# Patient Record
Sex: MALE | Race: WHITE | NOT HISPANIC OR LATINO | URBAN - METROPOLITAN AREA
[De-identification: names, ages, dates, MRNs, and addresses within clinical notes are randomized per-mention and may not be internally consistent; named-entity substitution may affect disease eponyms.]

---

## 2017-02-02 ENCOUNTER — ALLSCRIPTS OFFICE VISIT (OUTPATIENT)
Dept: OTHER | Facility: OTHER | Age: 42
End: 2017-02-02

## 2018-01-14 VITALS
DIASTOLIC BLOOD PRESSURE: 80 MMHG | WEIGHT: 175 LBS | RESPIRATION RATE: 14 BRPM | SYSTOLIC BLOOD PRESSURE: 130 MMHG | HEART RATE: 74 BPM | BODY MASS INDEX: 29.12 KG/M2

## 2022-01-13 ENCOUNTER — SEXUAL HEALTH (OUTPATIENT)
Dept: SURGERY | Facility: CLINIC | Age: 47
End: 2022-01-13

## 2022-01-13 DIAGNOSIS — Z11.3 SCREENING FOR STD (SEXUALLY TRANSMITTED DISEASE): Primary | ICD-10-CM

## 2022-01-13 NOTE — PROGRESS NOTES
Assessment/Plan:  Urine collected for GC/CT testing  Blood collected for HIV/syphilis testing  Recommend safer sex practices including 100% condom use  Recommend regular STD testing  Follow up 1 week for results  Practicing safe sex using a condom for each sexually encounter  Condoms offer the best protection against STD's by acting as a physical barrier to prevent the exchange of semen, vaginal fluids, and blood between partners  Condoms are not a 100% effective in protection  Reducing the number of sexual partners can also help to reduce the risk of the transmission of STD's along with regular STD screening  Patient verbalized understanding of all that was discusse today and agrees with plan  He was able to ask all of his questions and comprehensive answers were provided to the best of my ability  No problem-specific Assessment & Plan notes found for this encounter  Diagnoses and all orders for this visit:    Screening for STD (sexually transmitted disease)          Subjective:      Patient ID: Sumit Hare is a 56 y o  male  Patient presents to STD clinic for STD screening  Reports having multiple female partners and does not use condoms  Denies having any urethral discharge, burning or pain with urination, blood in urine, fever, chills, rash, sores, testicular pain  The following portions of the patient's history were reviewed and updated as appropriate: allergies, current medications, past family history, past medical history, past social history, past surgical history and problem list     Review of Systems   Constitutional: Negative for chills, diaphoresis, fatigue and fever  Gastrointestinal: Negative for abdominal pain  Genitourinary: Negative for decreased urine volume, difficulty urinating, dysuria, frequency, genital sores, hematuria, penile discharge, penile pain, penile swelling, scrotal swelling, testicular pain and urgency  Skin: Negative for rash and wound  Hematological: Negative for adenopathy  Objective: There were no vitals taken for this visit  Physical Exam  Nursing note reviewed  Constitutional:       General: He is not in acute distress  Appearance: Normal appearance  He is not ill-appearing, toxic-appearing or diaphoretic  HENT:      Head: Normocephalic and atraumatic  Eyes:      Conjunctiva/sclera: Conjunctivae normal    Neurological:      Mental Status: He is alert and oriented to person, place, and time  Gait: Gait normal    Psychiatric:         Mood and Affect: Mood normal          Behavior: Behavior normal          Thought Content: Thought content normal          Judgment: Judgment normal                 CHIEF CONCERN(S)        Condom Used: Occasionally    Sexual Preference :  Female    Date of Last Sexual Exposure: 1/11/22    # of Partners: Last 30 days 2, Last 90 days 4 and Last Year 6    Sexual Practices: Oral and Vaginal      Previously Sexually Transmitted Diseases  Type Date Source of Care Treatment Comment   Denies                         Test Date Results   [facility] 1/13/22 Pending   [facility] 1/13/22    GC 1/13/22    CT 1/13/22          1  CURRENT RISK BEHAVIOR(S)    Unprotected sex with multiple/anonymous partners     PREVIOUS SUCCESSES    Used condoms when having sex, Maintained a monogamous relationship with only one partner and Discussed condom use prior to having sex with partner(s)        3  SAFER GOAL BEHAVIOR(S)    Always use condoms to have sex, Practice abstinence, Pre-exposure prophylaxis (PrEP), Practice monogamy and Get tested if condom breaks/ leaks          4   PERSON ACTION PLAN:    > BARRIERS:    No condom use or discussions and Get involved in a monogamist relationship    > BENEFITS:    Obtain free condoms from [facility] to decrease STD exposure and Provides a healthier sexual relationship and can reduce STD's        > ACTION STEPS:      Use condoms at least 50% of the time and steadily increase over time until condom use is 100% of the time, Discuss condom use prior to having sex with partner(s) and Get tested is an exposure occurred such as a condom breaks/ leaked          4   REFERRALS:  Consent given for HIV testing

## 2022-01-20 ENCOUNTER — SEXUAL HEALTH (OUTPATIENT)
Dept: SURGERY | Facility: CLINIC | Age: 47
End: 2022-01-20

## 2022-01-20 DIAGNOSIS — Z71.2 ENCOUNTER TO DISCUSS TEST RESULTS: Primary | ICD-10-CM

## 2022-09-20 ENCOUNTER — OFFICE VISIT (OUTPATIENT)
Dept: FAMILY MEDICINE CLINIC | Facility: CLINIC | Age: 47
End: 2022-09-20
Payer: COMMERCIAL

## 2022-09-20 VITALS
HEIGHT: 65 IN | DIASTOLIC BLOOD PRESSURE: 82 MMHG | HEART RATE: 71 BPM | OXYGEN SATURATION: 98 % | WEIGHT: 194 LBS | BODY MASS INDEX: 32.32 KG/M2 | SYSTOLIC BLOOD PRESSURE: 135 MMHG

## 2022-09-20 DIAGNOSIS — Z13.1 SCREENING FOR DIABETES MELLITUS: ICD-10-CM

## 2022-09-20 DIAGNOSIS — L50.8 URTICARIA, ACUTE: ICD-10-CM

## 2022-09-20 DIAGNOSIS — Z00.00 PREVENTATIVE HEALTH CARE: Primary | ICD-10-CM

## 2022-09-20 DIAGNOSIS — Z13.220 SCREENING, LIPID: ICD-10-CM

## 2022-09-20 DIAGNOSIS — Z12.5 SCREENING FOR PROSTATE CANCER: ICD-10-CM

## 2022-09-20 DIAGNOSIS — Z00.00 ANNUAL PHYSICAL EXAM: ICD-10-CM

## 2022-09-20 DIAGNOSIS — Z23 NEED FOR VACCINATION: ICD-10-CM

## 2022-09-20 DIAGNOSIS — D17.20 LIPOMA OF UPPER EXTREMITY, UNSPECIFIED LATERALITY: ICD-10-CM

## 2022-09-20 DIAGNOSIS — Z12.11 SCREEN FOR COLON CANCER: ICD-10-CM

## 2022-09-20 PROCEDURE — 90471 IMMUNIZATION ADMIN: CPT | Performed by: FAMILY MEDICINE

## 2022-09-20 PROCEDURE — 3725F SCREEN DEPRESSION PERFORMED: CPT | Performed by: FAMILY MEDICINE

## 2022-09-20 PROCEDURE — 99386 PREV VISIT NEW AGE 40-64: CPT | Performed by: FAMILY MEDICINE

## 2022-09-20 PROCEDURE — 90715 TDAP VACCINE 7 YRS/> IM: CPT | Performed by: FAMILY MEDICINE

## 2022-09-20 NOTE — PROGRESS NOTES
ADULT ANNUAL [address]  [facility]    NAME: Joon Huitron  AGE: 56 y o  SEX: male  : 1975     DATE: 2022     Assessment and Plan:     Problem List Items Addressed This Visit        Musculoskeletal and Integument    Urticaria, acute       Other    Preventative health care - Primary     Advised metabolic labs, including PSA for prostate cancer screening,  patient prefers to get Cologuard for colorectal cancer screening  Patient received Adacel today  Declines flu vaccine  Lipoma of upper extremity     Painful lipoma of left UE, refer to gen surgery  Relevant Orders    Ambulatory Referral to General Surgery      Other Visit Diagnoses     Screen for colon cancer        Relevant Orders    Cologuard    Screening for prostate cancer        Relevant Orders    PSA, Total Screen    Need for vaccination        Relevant Orders    TDAP VACCINE GREATER THAN OR EQUAL TO 8YO IM (Completed)    Screening for diabetes mellitus        Relevant Orders    Comprehensive metabolic panel    Screening, lipid        Relevant Orders    Lipid panel          Immunizations and preventive care screenings were discussed with patient today  Appropriate education was printed on patient's after visit summary  Discussed risks and benefits of prostate cancer screening  We discussed the controversial history of PSA screening for prostate cancer in the [place] as well as the risk of over detection and over treatment of prostate cancer by way of PSA screening  The patient understands that PSA blood testing is an imperfect way to screen for prostate cancer and that elevated PSA levels in the blood may also be caused by infection, inflammation, prostatic trauma or manipulation, urological procedures, or by benign prostatic enlargement      The role of the digital rectal examination in prostate cancer screening was also discussed and I discussed with him that there is large interobserver variability in the findings of digital rectal examination  Counseling:  Dental Health: discussed importance of regular tooth brushing, flossing, and dental visits  · Exercise: the importance of regular exercise/physical activity was discussed  Recommend exercise 3-5 times per week for at least 30 minutes  BMI Counseling: Body mass index is 32 28 kg/m²  The BMI is above normal  Nutrition recommendations include decreasing portion sizes, encouraging healthy choices of fruits and vegetables and decreasing fast food intake  Exercise recommendations include moderate physical activity 150 minutes/week  No pharmacotherapy was ordered  Rationale for BMI follow-up plan is due to patient being overweight or obese  Depression Screening and Follow-up Plan: Patient was screened for depression during today's encounter  They screened negative with a PHQ-2 score of 0  No follow-ups on file  Chief Complaint:     Chief Complaint   Patient presents with    Physical Exam     New patient      History of Present Illness:     Adult Annual Physical   Patient here for a comprehensive physical exam  The patient reports problems - Lump on left forearm, chronic  Hurting now       Diet and Physical Activity  · Diet/Nutrition: well balanced diet  · Exercise: moderate cardiovascular exercise  Depression Screening  PHQ-2/9 Depression Screening    Little interest or pleasure in doing things: 0 - not at all  Feeling down, depressed, or hopeless: 0 - not at all  PHQ-2 Score: 0  PHQ-2 Interpretation: Negative depression screen       General Health  · Sleep: sleeps well  · Hearing: normal - bilateral   · Vision: no vision problems  · Dental: regular dental visits   Health  · Symptoms include: none     Review of Systems:     Review of Systems   Constitutional: Negative  Respiratory: Negative  Cardiovascular: Negative  Gastrointestinal: Negative      Musculoskeletal: Negative  Negative for myalgias  Skin:        Lump b/l forearms, abdomen wall  Neurological: Negative  Psychiatric/Behavioral: Negative  Past Medical History:     History reviewed  No pertinent past medical history  Past Surgical History:     History reviewed  No pertinent surgical history  Family History:     Family History   Problem Relation Age of Onset    Hyperlipidemia Father     Coronary artery disease Father       Social History:     Social History     Socioeconomic History    Marital status: /Civil Union     Spouse name: None    Number of children: None    Years of education: None    Highest education level: None   Occupational History    None   Tobacco Use    Smoking status: Never Smoker    Smokeless tobacco: Never Used   Vaping Use    Vaping Use: Never used   Substance and Sexual Activity    Alcohol use: Never    Drug use: Never    Sexual activity: None   Other Topics Concern    None   Social History Narrative    None     Social Determinants of Health     Financial Resource Strain: Not on file   Food Insecurity: Not on file   Transportation Needs: Not on file   Physical Activity: Not on file   Stress: Not on file   Social Connections: Not on file   Intimate Partner Violence: Not on file   Housing Stability: Not on file      Current Medications:     No current outpatient medications on file  No current facility-administered medications for this visit  Allergies:     No Known Allergies   Physical Exam:     /82   Pulse 71   Ht 5' 5" (1 651 m)   Wt 88 kg (194 lb)   SpO2 98%   BMI 32 28 kg/m²     Physical Exam  Vitals and nursing note reviewed  Constitutional:       Appearance: He is well-developed  HENT:      Right Ear: External ear normal       Left Ear: External ear normal    Eyes:      Pupils: Pupils are equal, round, and reactive to light  Cardiovascular:      Rate and Rhythm: Normal rate and regular rhythm        Heart sounds: Normal heart sounds  Pulmonary:      Effort: Pulmonary effort is normal       Breath sounds: Normal breath sounds  Abdominal:      Palpations: Abdomen is soft  There is no mass  Tenderness: There is no abdominal tenderness  There is no guarding  Musculoskeletal:         General: Normal range of motion  Cervical back: Normal range of motion and neck supple  Skin:     Comments: Lipomas on both forearms, trunk  Neurological:      Mental Status: He is alert and oriented to person, place, and time  Psychiatric:         Behavior: Behavior normal          Thought Content:  Thought content normal          Judgment: Judgment normal           Bryant Vee MD  [place] [id number]

## 2022-09-20 NOTE — ASSESSMENT & PLAN NOTE
Advised metabolic labs, including PSA for prostate cancer screening,  patient prefers to get Cologuard for colorectal cancer screening  Patient received Adacel today  Declines flu vaccine

## 2022-09-20 NOTE — PATIENT INSTRUCTIONS

## 2022-09-23 DIAGNOSIS — E78.2 MIXED HYPERLIPIDEMIA: Primary | ICD-10-CM

## 2022-09-23 LAB
ALBUMIN SERPL-MCNC: 4.5 G/DL (ref 4–5)
ALBUMIN/GLOB SERPL: 2 {RATIO} (ref 1.2–2.2)
ALP SERPL-CCNC: 71 IU/L (ref 44–121)
ALT SERPL-CCNC: 7 IU/L (ref 0–44)
AST SERPL-CCNC: 16 IU/L (ref 0–40)
BILIRUB SERPL-MCNC: 0.3 MG/DL (ref 0–1.2)
BUN SERPL-MCNC: 15 MG/DL (ref 6–24)
BUN/CREAT SERPL: 13 (ref 9–20)
CALCIUM SERPL-MCNC: 9.6 MG/DL (ref 8.7–10.2)
CHLORIDE SERPL-SCNC: 104 MMOL/L (ref 96–106)
CHOLEST SERPL-MCNC: 196 MG/DL (ref 100–199)
CO2 SERPL-SCNC: 22 MMOL/L (ref 20–29)
CREAT SERPL-MCNC: 1.2 MG/DL (ref 0.76–1.27)
EGFR: 76 ML/MIN/1.73
GLOBULIN SER-MCNC: 2.2 G/DL (ref 1.5–4.5)
GLUCOSE SERPL-MCNC: 84 MG/DL (ref 65–99)
HDLC SERPL-MCNC: 43 MG/DL
LDLC SERPL CALC-MCNC: 125 MG/DL (ref 0–99)
POTASSIUM SERPL-SCNC: 4.5 MMOL/L (ref 3.5–5.2)
PROT SERPL-MCNC: 6.7 G/DL (ref 6–8.5)
PSA SERPL-MCNC: 2.2 NG/ML (ref 0–4)
SL AMB VLDL CHOLESTEROL CALC: 28 MG/DL (ref 5–40)
SODIUM SERPL-SCNC: 141 MMOL/L (ref 134–144)
TRIGL SERPL-MCNC: 155 MG/DL (ref 0–149)

## 2022-09-27 LAB — COLOGUARD RESULT REPORTABLE: NEGATIVE

## 2022-09-28 ENCOUNTER — TELEPHONE (OUTPATIENT)
Dept: FAMILY MEDICINE CLINIC | Facility: CLINIC | Age: 47
End: 2022-09-28

## 2022-09-28 NOTE — TELEPHONE ENCOUNTER
----- Message from Abdi Kang MD sent at 9/28/2022  1:47 PM EDT -----  Please call patient with normal results

## 2022-12-01 ENCOUNTER — OFFICE VISIT (OUTPATIENT)
Dept: FAMILY MEDICINE CLINIC | Facility: CLINIC | Age: 47
End: 2022-12-01

## 2022-12-01 VITALS
OXYGEN SATURATION: 95 % | SYSTOLIC BLOOD PRESSURE: 128 MMHG | BODY MASS INDEX: 31.99 KG/M2 | WEIGHT: 192 LBS | HEIGHT: 65 IN | HEART RATE: 85 BPM | DIASTOLIC BLOOD PRESSURE: 82 MMHG

## 2022-12-01 DIAGNOSIS — N48.21 PENILE ABSCESS: Primary | ICD-10-CM

## 2022-12-01 DIAGNOSIS — L30.9 ECZEMA, UNSPECIFIED TYPE: ICD-10-CM

## 2022-12-01 RX ORDER — CEPHALEXIN 500 MG/1
500 CAPSULE ORAL EVERY 12 HOURS SCHEDULED
Qty: 14 CAPSULE | Refills: 0 | Status: SHIPPED | OUTPATIENT
Start: 2022-12-01 | End: 2022-12-08

## 2022-12-01 NOTE — PROGRESS NOTES
A Subjective:      Patient ID: Jerman Gutierrez is a 56 y o  male  HPI    Patient is here reporting a small pimple on the penile shaft, notice few days ago  No pain / redness in area  Also reports rash on both temples  He has a been using OTC hydrocortisone for 3 weeks now, and has noticed a mild improvement  Skin texture however remains slightly  rough  History reviewed  No pertinent past medical history  Family History   Problem Relation Age of Onset   • Hyperlipidemia Father    • Coronary artery disease Father        History reviewed  No pertinent surgical history  reports that he has never smoked  He has never used smokeless tobacco  He reports that he does not drink alcohol and does not use drugs  Current Outpatient Medications:   •  cephalexin (KEFLEX) 500 mg capsule, Take 1 capsule (500 mg total) by mouth every 12 (twelve) hours for 7 days, Disp: 14 capsule, Rfl: 0    The following portions of the patient's history were reviewed and updated as appropriate: allergies, current medications, past family history, past medical history, past social history, past surgical history and problem list     Review of Systems   Constitutional: Negative  Respiratory: Negative  Cardiovascular: Negative  Gastrointestinal: Negative  Genitourinary:        In growing hair on penile shaft   Musculoskeletal: Negative  Negative for myalgias  Neurological: Negative  Negative for focal weakness  Psychiatric/Behavioral: Negative  Objective:    /82   Pulse 85   Ht 5' 5" (1 651 m)   Wt 87 1 kg (192 lb)   SpO2 95%   BMI 31 95 kg/m²      Physical Exam  Constitutional:       Appearance: He is well-developed and well-nourished  HENT:      Mouth/Throat:      Pharynx: No oropharyngeal exudate  Cardiovascular:      Rate and Rhythm: Normal rate and regular rhythm  Pulmonary:      Effort: Pulmonary effort is normal       Breath sounds: Normal breath sounds     Abdominal: General: Bowel sounds are normal       Palpations: Abdomen is soft  Genitourinary:     Comments: Folliculitis at base of the penile shaft  Overlying skin appears reviewed the normal    Skin:     Comments: Slightly rough texture on both temples  Neurological:      Mental Status: He is alert and oriented to person, place, and time  Psychiatric:         Behavior: Behavior normal          Judgment: Judgment normal            No results found for this or any previous visit (from the past 1008 hour(s))  Assessment/Plan:    No problem-specific Assessment & Plan notes found for this encounter  Problem List Items Addressed This Visit        Musculoskeletal and Integument    Eczema     Dry skin on both temples  Improving with topical otc hydrocortisone  Continue with caution  Refer to Dermatology if symptoms flare up         Relevant Orders    Ambulatory Referral to Dermatology       Genitourinary    Penile abscess - Primary     Small folliculitis on base of penile shaft  No overlying inflammation noted  A patient started on antibiotic  Advised warm compresses of the area  Recommended urology evaluation if symptoms worsen            Relevant Medications    cephalexin (KEFLEX) 500 mg capsule    Other Relevant Orders    Ambulatory Referral to Urology

## 2022-12-01 NOTE — ASSESSMENT & PLAN NOTE
Small folliculitis on base of penile shaft  No overlying inflammation noted  A patient started on antibiotic  Advised warm compresses of the area  Recommended urology evaluation if symptoms worsen

## 2022-12-01 NOTE — ASSESSMENT & PLAN NOTE
Dry skin on both temples  Improving with topical otc hydrocortisone  Continue with caution    Refer to Dermatology if symptoms flare up

## 2024-04-17 ENCOUNTER — OFFICE VISIT (OUTPATIENT)
Dept: FAMILY MEDICINE CLINIC | Facility: CLINIC | Age: 49
End: 2024-04-17
Payer: COMMERCIAL

## 2024-04-17 VITALS — HEIGHT: 65 IN | BODY MASS INDEX: 31.65 KG/M2 | WEIGHT: 190 LBS

## 2024-04-17 DIAGNOSIS — T30.0 BURN: Primary | ICD-10-CM

## 2024-04-17 DIAGNOSIS — L03.90 CELLULITIS, UNSPECIFIED CELLULITIS SITE: ICD-10-CM

## 2024-04-17 PROCEDURE — 99213 OFFICE O/P EST LOW 20 MIN: CPT | Performed by: NURSE PRACTITIONER

## 2024-04-17 RX ORDER — CEPHALEXIN 500 MG/1
500 CAPSULE ORAL EVERY 6 HOURS SCHEDULED
Qty: 28 CAPSULE | Refills: 0 | Status: SHIPPED | OUTPATIENT
Start: 2024-04-17 | End: 2024-04-24

## 2024-04-17 NOTE — PROGRESS NOTES
Virtual Regular Visit    Verification of patient location:    Patient is located at Home in the following state in which I hold an active license [place]      Assessment/Plan:    Problem List Items Addressed This Visit          Surgery/Wound/Pain    Burn - Primary    Relevant Medications    cephalexin (KEFLEX) 500 mg capsule    Other Relevant Orders    Ambulatory Referral to Wound Care       Other    Cellulitis    Relevant Medications    cephalexin (KEFLEX) 500 mg capsule    Other Relevant Orders    Ambulatory Referral to Wound Care         Patient reports that he accidentally spilled hot water on his foot while cooking pasta a week ago.   Patient reports that some of the skin ripped off and he has a hole in his foot.   Patient reports that the area is red around it.   Denies any fever or drainage.   Patient has been putting neosporin on it.   Wound noted on top of left foot on video visit.   Erythema noted around it.   Keflex prescribed for infection. Medication information and side effects reviewed.   Patient referred to [facility] for further evaluation and treatment.   Patient instructed to follow-up prn.     Reason for visit is   Chief Complaint   Patient presents with    Wound    Virtual Regular Visit        Encounter provider [facility]    Provider located at [address] [place] [id number]      Recent Visits  No visits were found meeting these conditions.  Showing recent visits within past 7 days and meeting all other requirements  Today's Visits  Date Type Provider Dept   04/17/24 Office Visit [facility]   Showing today's visits and meeting all other requirements  Future Appointments  No visits were found meeting these conditions.  Showing future appointments within next 150 days and meeting all other requirements       The patient was identified by name and date of birth. Jason Hughes was  "informed that this is a telemedicine visit and that the visit is being conducted through the [employer] platform. He agrees to proceed..  My office door was closed. No one else was in the room.  He acknowledged consent and understanding of privacy and security of the video platform. The patient has agreed to participate and understands they can discontinue the visit at any time.    Patient is aware this is a billable service.     Subjective  Jason Hughes is a 49 y.o. male  .      Patient reports that he burned the top of his foot while cooking pasta a week ago.   Patient reports that he accidentally spilled hot water on it.   Patient reports that some of the skin ripped off 2 days later.   Patient reports that he has a hole in his foot.   Patient reports that the area is red around it.   Denies any drainage.   Denies any fever.   Patient reports that he has been putting neosporin on it.   Patient reports that his foot does not look like it is healing.          History reviewed. No pertinent past medical history.    History reviewed. No pertinent surgical history.    Current Outpatient Medications   Medication Sig Dispense Refill    cephalexin (KEFLEX) 500 mg capsule Take 1 capsule (500 mg total) by mouth every 6 (six) hours for 7 days 28 capsule 0     No current facility-administered medications for this visit.        No Known Allergies    Review of Systems   Constitutional:  Negative for chills and fever.   HENT:  Negative for congestion, ear pain and sore throat.    Respiratory:  Negative for cough, shortness of breath and wheezing.    Cardiovascular:  Negative for chest pain.   Gastrointestinal:  Negative for abdominal pain, diarrhea, nausea and vomiting.   Skin:         As noted in HPI.    Neurological:  Negative for dizziness, light-headedness and headaches.       Video Exam    Vitals:    04/17/24 0855   Weight: 86.2 kg (190 lb)   Height: 5' 5\" (1.651 m)       Physical Exam  Vitals reviewed.   Constitutional:  "      General: He is not in acute distress.     Appearance: He is not ill-appearing or diaphoretic.   HENT:      Right Ear: External ear normal.      Left Ear: External ear normal.   Pulmonary:      Effort: Pulmonary effort is normal. No respiratory distress.   Skin:     Comments: Wound noted on left foot on video visit.   Erythema noted around it.    Neurological:      Mental Status: He is alert and oriented to person, place, and time.   Psychiatric:         Mood and Affect: Mood normal.          Visit Time  Total Visit Duration: 10 minutes

## 2024-04-26 ENCOUNTER — OFFICE VISIT (OUTPATIENT)
Dept: WOUND CARE | Facility: HOSPITAL | Age: 49
End: 2024-04-26
Payer: COMMERCIAL

## 2024-04-26 VITALS
HEART RATE: 72 BPM | BODY MASS INDEX: 31.65 KG/M2 | DIASTOLIC BLOOD PRESSURE: 97 MMHG | SYSTOLIC BLOOD PRESSURE: 145 MMHG | HEIGHT: 65 IN | TEMPERATURE: 97.6 F | RESPIRATION RATE: 15 BRPM | WEIGHT: 190 LBS

## 2024-04-26 DIAGNOSIS — S91.302A OPEN WOUND OF LEFT FOOT, INITIAL ENCOUNTER: Primary | ICD-10-CM

## 2024-04-26 PROCEDURE — 97597 DBRDMT OPN WND 1ST 20 CM/<: CPT | Performed by: FAMILY MEDICINE

## 2024-04-26 PROCEDURE — 99213 OFFICE O/P EST LOW 20 MIN: CPT | Performed by: FAMILY MEDICINE

## 2024-04-26 PROCEDURE — 99203 OFFICE O/P NEW LOW 30 MIN: CPT | Performed by: FAMILY MEDICINE

## 2024-04-26 RX ORDER — LIDOCAINE 40 MG/G
CREAM TOPICAL ONCE
Status: COMPLETED | OUTPATIENT
Start: 2024-04-26 | End: 2024-04-26

## 2024-04-26 RX ORDER — IBUPROFEN 200 MG
400 TABLET ORAL EVERY 6 HOURS PRN
COMMUNITY

## 2024-04-26 RX ADMIN — LIDOCAINE: 40 CREAM TOPICAL at 08:39

## 2024-04-26 NOTE — PROGRESS NOTES
Patient ID: Jason Hughes is a 49 y.o. male Date of Birth 1975       Chief Complaint   Patient presents with   • New Patient Visit     Left foot       Allergies:  Patient has no known allergies.    Diagnosis:      Diagnosis ICD-10-CM Associated Orders   1. Open wound of left foot, initial encounter  S91.302A lidocaine (LMX) 4 % cream     Wound cleansing and dressings Traumatic Anterior;Left Foot     Wound Procedure Treatment Traumatic Anterior;Left Foot     Debridement Traumatic Anterior;Left Foot              Assessment & Plan:  Traumatic wound of the dorsum of the left foot.  No clinical signs of infection at this time.  Status posttreatment with cephalexin via primary care.  Selective debridement.  Puracol Plus Ag and small bordered foam to be changed 3 times a week.  Follow-up in 1 week.         Subjective:   4/26/2024: First visit for this 49-year-old male referred to the wound center because of a wound on the dorsum of his left foot.  Approximately 2 weeks ago he was boiling [place] and the water spilled onto his left foot.  He was wearing crocs and therefore sustained a small area of burn.  He states that although there was erythema it did not blister or open.  About a week ago he was working out with [employer] and hit the top of the foot where the burn was.  This caused an open wound which led to the referral to the wound center.  He was seen by PCP who prescribed cephalexin.  Past medical history is unremarkable.        The following portions of the patient's history were reviewed and updated as appropriate:   Patient Active Problem List   Diagnosis   • Preventative health care   • Urticaria, acute   • Lipoma of upper extremity   • Penile abscess   • Eczema   • Burn   • Cellulitis     History reviewed. No pertinent past medical history.  History reviewed. No pertinent surgical history.  Family History   Problem Relation Age of Onset   • Hyperlipidemia Father    • Coronary artery disease Father       Social  "History     Socioeconomic History   • Marital status: /Civil Union     Spouse name: Billie   • Number of children: 3   • Years of education: 12   • Highest education level: High school graduate   Occupational History   • None   Tobacco Use   • Smoking status: Never   • Smokeless tobacco: Never   Vaping Use   • Vaping status: Never Used   Substance and Sexual Activity   • Alcohol use: Never   • Drug use: Never   • Sexual activity: None   Other Topics Concern   • None   Social History Narrative   • None     Social Determinants of Health     Financial Resource Strain: Not on file   Food Insecurity: Not on file   Transportation Needs: Not on file   Physical Activity: Not on file   Stress: Not on file   Social Connections: Not on file   Intimate Partner Violence: Not on file   Housing Stability: Not on file        Current Outpatient Medications:   •  ibuprofen (MOTRIN) 200 mg tablet, Take 400 mg by mouth every 6 (six) hours as needed for mild pain, Disp: , Rfl:   No current facility-administered medications for this visit.    Review of Systems   Constitutional:  Negative for appetite change, chills, fatigue, fever and unexpected weight change.   HENT:  Negative for congestion, hearing loss and postnasal drip.    Respiratory:  Negative for cough and shortness of breath.    Cardiovascular:  Negative for leg swelling.   Musculoskeletal:  Negative for gait problem.   Skin:  Positive for wound (Left foot). Negative for rash.   Neurological:  Negative for numbness.   Hematological:  Does not bruise/bleed easily.   Psychiatric/Behavioral: Negative.         Objective:  /97   Pulse 72   Temp 97.6 °F (36.4 °C)   Resp 15   Ht 5' 5\" (1.651 m)   Wt 86.2 kg (190 lb)   BMI 31.62 kg/m²   Pain Score: 0-No pain (has pain if walking)     Physical Exam  Vitals and nursing note reviewed.   Constitutional:       Appearance: Normal appearance. He is well-developed and normal weight.   HENT:      Head: Normocephalic and " "atraumatic.   Cardiovascular:      Rate and Rhythm: Normal rate.      Pulses:           Dorsalis pedis pulses are 2+ on the left side.        Posterior tibial pulses are 2+ on the left side.   Pulmonary:      Effort: Pulmonary effort is normal.   Musculoskeletal:      Right lower leg: No edema.      Left lower leg: No edema.        Feet:    Feet:      Left foot:      Skin integrity: Skin breakdown and erythema present.      Comments: Circular wound, full-thickness with slough and fibrin.  Localized surrounding erythema without lymphangitic streaking.  Skin:     General: Skin is warm and dry.      Findings: Wound present.   Neurological:      Mental Status: He is alert and oriented to person, place, and time.   Psychiatric:         Attention and Perception: Attention normal.         Mood and Affect: Mood and affect normal.         Behavior: Behavior is cooperative.         Cognition and Memory: Cognition normal.         Wound 04/26/24 Traumatic Foot Anterior;Left (Active)   Wound Image Images linked 04/26/24 0813   Wound Description Yellow;Slough;Pink;Granulation tissue 04/26/24 0813   Justine-wound Assessment Pink 04/26/24 0813   Wound Length (cm) 0.7 cm 04/26/24 0813   Wound Width (cm) 0.7 cm 04/26/24 0813   Wound Depth (cm) 0.1 cm 04/26/24 0813   Wound Surface Area (cm^2) 0.49 cm^2 04/26/24 0813   Wound Volume (cm^3) 0.049 cm^3 04/26/24 0813   Calculated Wound Volume (cm^3) 0.05 cm^3 04/26/24 0813   Drainage Amount Small 04/26/24 0813   Drainage Description Serosanguineous 04/26/24 0813   Non-staged Wound Description Full thickness 04/26/24 0813   Dressing Status Intact (upon arrival) 04/26/24 0813        Debridement   Wound 04/26/24 Traumatic Foot Anterior;Left    Universal Protocol:  Consent: Verbal consent obtained. Written consent obtained.  Consent given by: patient  Time out: Immediately prior to procedure a \"time out\" was called to verify the correct patient, procedure, equipment, support staff and site/side " "marked as required.  Patient understanding: patient states understanding of the procedure being performed  Patient identity confirmed: verbally with patient    Debridement Details  Performed by: physician  Debridement type: selective  Pain control: lidocaine 4%      Post-debridement measurements  Length (cm): 0.7  Width (cm): 0.7  Depth (cm): 0.1  Percent debrided: 100%  Surface Area (cm^2): 0.49  Area Debrided (cm^2): 0.49  Volume (cm^3): 0.05    Devitalized tissue debrided: fibrin and slough  Instrument(s) utilized: curette  Bleeding: small  Hemostasis obtained with: pressure  Procedural pain (0-10): 0  Post-procedural pain: 0   Response to treatment: procedure was tolerated well               No results found for: \"HGBA1C\"    Wound Instructions:  Orders Placed This Encounter   Procedures   • Wound cleansing and dressings Traumatic Anterior;Left Foot     LEFT FOOT WOUND:    Wash your hands with soap and water.  Remove old dressing, discard into plastic bag and place in trash.  Cleanse the wound with Dove soap and water prior to applying a clean dressing. Do not use tissue or cotton balls. Do not scrub the wound. Pat dry using gauze.  Shower yes IF you cover wound in shower on the days you are not changing dressing.  On the days you are changing the dressing, wash the wound last in the shower with Dove soap and then rinse with water and get out of shower, pat dry and then apply wound product.  Do not soak wound. Do not leave wound open to air.    Apply Puracol AG to the foot wound.  Cover with bordered foam lite    Change dressing three times per week.      Protein: Eat protein with each meal to promote healing.  Examples of protein are fish, meat, chicken, nuts, peanut butter, eggs, lentils, edamame or a protein shake.    Wound infection:  If you have signs of infection please call the wound center.  If the wound center is closed- please go to the Emergency department.  Some signs of infection:  fever, chills, " "increased redness, red streaks, increase in pain, increased drainage.  Drainage with an odor, Change in drainage color: white/milky/green/tan/yellow,  an increase in swelling, chest pain and/or shortness of breath.     Standing Status:   Future     Standing Expiration Date:   5/3/2024   • Wound Procedure Treatment Traumatic Anterior;Left Foot     This order was created via procedure documentation   • Debridement Traumatic Anterior;Left Foot     This order was created via procedure documentation             Jonathan Soria MD, CHT, CWS    Portions of the record may have been created with voice recognition software. Occasional wrong word or \"sound alike\" substitutions may have occurred due to the inherent limitations of voice recognition software. Read the chart carefully and recognize, using context, where substitutions have occurred.    "

## 2024-04-26 NOTE — PATIENT INSTRUCTIONS
Orders Placed This Encounter   Procedures    Wound cleansing and dressings Traumatic Anterior;Left Foot     LEFT FOOT WOUND:    Wash your hands with soap and water.  Remove old dressing, discard into plastic bag and place in trash.  Cleanse the wound with Dove soap and water prior to applying a clean dressing. Do not use tissue or cotton balls. Do not scrub the wound. Pat dry using gauze.  Shower yes IF you cover wound in shower on the days you are not changing dressing.  On the days you are changing the dressing, wash the wound last in the shower with Dove soap and then rinse with water and get out of shower, pat dry and then apply wound product.  Do not soak wound. Do not leave wound open to air.    Apply Puracol AG to the foot wound.  Cover with bordered foam lite    Change dressing three times per week.      Protein: Eat protein with each meal to promote healing.  Examples of protein are fish, meat, chicken, nuts, peanut butter, eggs, lentils, edamame or a protein shake.    Wound infection:  If you have signs of infection please call the wound center.  If the wound center is closed- please go to the Emergency department.  Some signs of infection:  fever, chills, increased redness, red streaks, increase in pain, increased drainage.  Drainage with an odor, Change in drainage color: white/milky/green/tan/yellow,  an increase in swelling, chest pain and/or shortness of breath.     Standing Status:   Future     Standing Expiration Date:   5/3/2024

## 2024-04-26 NOTE — PROGRESS NOTES
Wound Procedure Treatment Traumatic Anterior;Left Foot    Performed by: Lily Allen RN  Authorized by: Jonathan Soria MD  Associated wounds:   Wound 04/26/24 Traumatic Foot Anterior;Left  Wound cleansed with:  NSS  Applied primary dressing:  Collagen dressing, Silver and Silicone bordered foam

## 2024-05-03 ENCOUNTER — OFFICE VISIT (OUTPATIENT)
Dept: WOUND CARE | Facility: HOSPITAL | Age: 49
End: 2024-05-03
Payer: COMMERCIAL

## 2024-05-03 VITALS
RESPIRATION RATE: 18 BRPM | SYSTOLIC BLOOD PRESSURE: 138 MMHG | TEMPERATURE: 96.7 F | DIASTOLIC BLOOD PRESSURE: 85 MMHG | HEART RATE: 63 BPM

## 2024-05-03 DIAGNOSIS — S91.302A OPEN WOUND OF LEFT FOOT, INITIAL ENCOUNTER: Primary | ICD-10-CM

## 2024-05-03 PROCEDURE — 97597 DBRDMT OPN WND 1ST 20 CM/<: CPT | Performed by: STUDENT IN AN ORGANIZED HEALTH CARE EDUCATION/TRAINING PROGRAM

## 2024-05-03 RX ORDER — LIDOCAINE 40 MG/G
CREAM TOPICAL ONCE
Status: COMPLETED | OUTPATIENT
Start: 2024-05-03 | End: 2024-05-03

## 2024-05-03 RX ADMIN — LIDOCAINE 1 APPLICATION: 40 CREAM TOPICAL at 08:32

## 2024-05-03 NOTE — PATIENT INSTRUCTIONS
Orders Placed This Encounter   Procedures    Wound cleansing and dressings Traumatic Anterior;Left Foot     LEFT FOOT WOUND:     Wash your hands with soap and water. Remove old dressing, discard into plastic bag and place in trash. Cleanse the wound with Dove soap and water prior to applying a clean dressing. Do not use tissue or cotton balls. Do not scrub the wound. Pat dry using gauze.     Shower yes IF you cover wound in shower on the days you are not changing dressing.     On the days you are changing the dressing, wash the wound last in the shower with Dove soap and then rinse with water and get out of shower, pat dry and then apply wound product. Do not soak wound. Do not leave wound open to air.     Apply Puracol AG to the foot wound.   Cover with bordered foam lite   Change dressing three times per week.       Protein: Eat protein with each meal to promote healing. Examples of protein are fish, meat, chicken, nuts, peanut butter, eggs, lentils, edamame or a protein shake. Wound infection: If you have signs of infection please call the wound center. If the wound center is closedplease go to the Emergency department. Some signs of infection: fever, chills, increased redness, red streaks, increase in pain, increased drainage. Drainage with an odor, Change in drainage color: white/milky/green/tan/yellow, an increase in swelling, chest pain and/or shortness of breath.     Standing Status:   Future     Standing Expiration Date:   5/10/2024    Wound Procedure Treatment Traumatic Anterior;Left Foot     This order was created via procedure documentation

## 2024-05-03 NOTE — PROGRESS NOTES
Wound Procedure Treatment Traumatic Anterior;Left Foot    Performed by: Janelle Kimball RN  Authorized by: Sarah Sandy MD  Associated wounds:   Wound 04/26/24 Traumatic Foot Anterior;Left  Wound cleansed with:  NSS  Applied primary dressing:  Collagen dressing, Silver and Silicone bordered foam

## 2024-05-03 NOTE — PROGRESS NOTES
"Patient ID: Jason Hughes is a 49 y.o. male Date of Birth 1975     Chief Complaint  Chief Complaint   Patient presents with    Follow Up Wound Care Visit     Left foot wound       Allergies  Patient has no known allergies.    Assessment:     Diagnoses and all orders for this visit:    Open wound of left foot, initial encounter  -     lidocaine (LMX) 4 % cream  -     Wound cleansing and dressings Traumatic Anterior;Left Foot; Future  -     Wound Procedure Treatment Traumatic Anterior;Left Foot  -     Debridement                Debridement   Wound 04/26/24 Traumatic Foot Anterior;Left    Universal Protocol:  Consent: Verbal consent obtained.  Risks and benefits: risks, benefits and alternatives were discussed  Consent given by: patient  Time out: Immediately prior to procedure a \"time out\" was called to verify the correct patient, procedure, equipment, support staff and site/side marked as required.  Patient identity confirmed: verbally with patient    Debridement Details  Performed by: physician  Debridement type: selective  Pain control: lidocaine 4%      Post-debridement measurements  Length (cm): 0.3  Width (cm): 0.3  Depth (cm): 0.1  Percent debrided: 90%  Surface Area (cm^2): 0.09  Area Debrided (cm^2): 0.08  Volume (cm^3): 0.01    Devitalized tissue debrided: biofilm and exudate  Instrument(s) utilized: curette  Bleeding: small  Hemostasis obtained with: pressure  Procedural pain (0-10): 1  Post-procedural pain: 0   Response to treatment: procedure was tolerated well        Plan:   It was a pleasure to see Jason Hughes for wound care follow up today  Selective debridement performed today as above  Wound is improving   Continue plan of care as noted below with puracol ag  No signs or symptoms of infection today. Patient understands that if any signs of infection start (such as increased redness, drainage, pain, fever, chills, diaphoresis), they should call our office or proceed to the ER or Urgent " Care.  Patient should continue a high protein diet to facilitate wound healing  Patient is advised to not submerge wound or leave wound open to air.  Follow up in 2 weeks  Given the multi-factorial nature of wound care, additional time was taken to review patient's treatment plan with other specialties and most recent pertinent lab work and imaging.   All plans of care discussed with patient at bedside who verbalized understanding with treatment plan.    Wound 04/26/24 Traumatic Foot Anterior;Left (Active)   Wound Image Images linked 05/03/24 0829   Wound Description Yellow;Pink 05/03/24 0829   Justine-wound Assessment Pink 05/03/24 0829   Wound Length (cm) 0.3 cm 05/03/24 0829   Wound Width (cm) 0.3 cm 05/03/24 0829   Wound Depth (cm) 0.1 cm 05/03/24 0829   Wound Surface Area (cm^2) 0.09 cm^2 05/03/24 0829   Wound Volume (cm^3) 0.009 cm^3 05/03/24 0829   Calculated Wound Volume (cm^3) 0.01 cm^3 05/03/24 0829   Change in Wound Size % 80 05/03/24 0829   Drainage Amount Small 05/03/24 0829   Drainage Description Serosanguineous 05/03/24 0829   Non-staged Wound Description Full thickness 05/03/24 0829   Dressing Status Intact 05/03/24 0829       Wound 04/26/24 Traumatic Foot Anterior;Left (Active)   Date First Assessed/Time First Assessed: 04/26/24 0812   Primary Wound Type: Traumatic  Location: Foot  Wound Location Orientation: Anterior;Left       Subjective:      .    5/3/24: Patient taking a break from [employer].  Applying Puracol as directed.  Happy with wound healing.    4/26/2024: First visit for this 49-year-old male referred to the wound center because of a wound on the dorsum of his left foot.  Approximately 2 weeks ago he was boiling [place] and the water spilled onto his left foot.  He was wearing crocs and therefore sustained a small area of burn.  He states that although there was erythema it did not blister or open.  About a week ago he was working out with [employer] and hit the top of the foot where the burn  was.  This caused an open wound which led to the referral to the wound center.  He was seen by PCP who prescribed cephalexin.  Past medical history is unremarkable.          The following portions of the patient's history were reviewed and updated as appropriate: allergies, current medications, past family history, past medical history, past social history, past surgical history, and problem list.    Review of Systems   Constitutional:  Negative for chills, diaphoresis and fever.   Skin:  Positive for wound.   All other systems reviewed and are negative.        Objective:       Wound 04/26/24 Traumatic Foot Anterior;Left (Active)   Wound Image Images linked 05/03/24 0829   Wound Description Yellow;Pink 05/03/24 0829   Justine-wound Assessment Pink 05/03/24 0829   Wound Length (cm) 0.3 cm 05/03/24 0829   Wound Width (cm) 0.3 cm 05/03/24 0829   Wound Depth (cm) 0.1 cm 05/03/24 0829   Wound Surface Area (cm^2) 0.09 cm^2 05/03/24 0829   Wound Volume (cm^3) 0.009 cm^3 05/03/24 0829   Calculated Wound Volume (cm^3) 0.01 cm^3 05/03/24 0829   Change in Wound Size % 80 05/03/24 0829   Drainage Amount Small 05/03/24 0829   Drainage Description Serosanguineous 05/03/24 0829   Non-staged Wound Description Full thickness 05/03/24 0829   Dressing Status Intact 05/03/24 0829       /85   Pulse 63   Temp (!) 96.7 °F (35.9 °C)   Resp 18     Physical Exam  Vitals reviewed.   Constitutional:       Appearance: Normal appearance.   HENT:      Head: Normocephalic and atraumatic.   Eyes:      Extraocular Movements: Extraocular movements intact.   Pulmonary:      Effort: Pulmonary effort is normal.   Musculoskeletal:      Cervical back: Neck supple.   Skin:     Comments: Dorsal left foot wound significantly smaller than last exam.  Healthy wound bed.  No signs of infection.   Neurological:      Mental Status: He is alert.   Psychiatric:         Mood and Affect: Mood normal.           Wound Instructions:  Orders Placed This Encounter  "  Procedures    Wound cleansing and dressings Traumatic Anterior;Left Foot     LEFT FOOT WOUND:     Wash your hands with soap and water. Remove old dressing, discard into plastic bag and place in trash. Cleanse the wound with Dove soap and water prior to applying a clean dressing. Do not use tissue or cotton balls. Do not scrub the wound. Pat dry using gauze.     Shower yes IF you cover wound in shower on the days you are not changing dressing.     On the days you are changing the dressing, wash the wound last in the shower with Dove soap and then rinse with water and get out of shower, pat dry and then apply wound product. Do not soak wound. Do not leave wound open to air.     Apply Puracol AG to the foot wound.   Cover with bordered foam lite   Change dressing three times per week.       Protein: Eat protein with each meal to promote healing. Examples of protein are fish, meat, chicken, nuts, peanut butter, eggs, lentils, edamame or a protein shake. Wound infection: If you have signs of infection please call the wound center. If the wound center is closedplease go to the Emergency department. Some signs of infection: fever, chills, increased redness, red streaks, increase in pain, increased drainage. Drainage with an odor, Change in drainage color: white/milky/green/tan/yellow, an increase in swelling, chest pain and/or shortness of breath.     Standing Status:   Future     Standing Expiration Date:   5/10/2024    Wound Procedure Treatment Traumatic Anterior;Left Foot     This order was created via procedure documentation    Debridement     This order was created via procedure documentation        Diagnosis ICD-10-CM Associated Orders   1. Open wound of left foot, initial encounter  S91.302A lidocaine (LMX) 4 % cream     Wound cleansing and dressings Traumatic Anterior;Left Foot     Wound Procedure Treatment Traumatic Anterior;Left Foot     Debridement          --  Sarah Sandy MD    \"This note has been " "constructed using a voice recognition system. Therefore there may be syntax, spelling, and/or grammatical errors. Occasional wrong word or \"sound alike\" substitutions may have occurred due to the inherent limitations of voice recognition software. Read the chart carefully and recognize, using context, where substitutions have occurred. Please call if you have any questions.\"     "

## 2024-10-30 ENCOUNTER — OFFICE VISIT (OUTPATIENT)
Dept: FAMILY MEDICINE CLINIC | Facility: CLINIC | Age: 49
End: 2024-10-30

## 2024-10-30 VITALS
HEIGHT: 65 IN | OXYGEN SATURATION: 100 % | HEART RATE: 88 BPM | BODY MASS INDEX: 33.15 KG/M2 | WEIGHT: 199 LBS | DIASTOLIC BLOOD PRESSURE: 80 MMHG | SYSTOLIC BLOOD PRESSURE: 122 MMHG

## 2024-10-30 DIAGNOSIS — L21.9 SEBORRHEIC DERMATITIS: ICD-10-CM

## 2024-10-30 DIAGNOSIS — L24.81 IRRITANT CONTACT DERMATITIS DUE TO METALS: Primary | ICD-10-CM

## 2024-10-30 DIAGNOSIS — L30.9 MILD ECZEMA: ICD-10-CM

## 2024-10-30 PROBLEM — T30.0 BURN: Status: RESOLVED | Noted: 2024-04-17 | Resolved: 2024-10-30

## 2024-10-30 PROBLEM — Z00.00 PREVENTATIVE HEALTH CARE: Status: RESOLVED | Noted: 2022-09-20 | Resolved: 2024-10-30

## 2024-10-30 PROBLEM — L03.90 CELLULITIS: Status: RESOLVED | Noted: 2024-04-17 | Resolved: 2024-10-30

## 2024-10-30 PROBLEM — N48.21 PENILE ABSCESS: Status: RESOLVED | Noted: 2022-12-01 | Resolved: 2024-10-30

## 2024-10-30 RX ORDER — TRIAMCINOLONE ACETONIDE 1 MG/G
CREAM TOPICAL 2 TIMES DAILY
Qty: 80 G | Refills: 0 | Status: SHIPPED | OUTPATIENT
Start: 2024-10-30

## 2024-10-30 RX ORDER — KETOCONAZOLE 20 MG/ML
1 SHAMPOO TOPICAL 2 TIMES WEEKLY
Qty: 120 ML | Refills: 0 | Status: SHIPPED | OUTPATIENT
Start: 2024-10-31

## 2024-10-30 NOTE — PROGRESS NOTES
Outpatient Note- Follow up     HPI:     Jason Hughes , 49 y.o. male  presents today for multiple rashes.  The patient started having rashes about 2 months ago.  He has been using clippers to clip his hair and keep it short.  He is also shaving/clipping the area multiple times a week.  He previously saw an online dermatologist that had him take photos and notes to him from the images.  The patient was started on minocycline and clindamycin gel.  He does not find that either of these had a significant improvement in his symptoms.  His main concern is an area around the right eye, the back of the head, and underneath his armpits.  He has been attempting multiple types of antiperspirant/deodorant, the most recent 1 caused irritation around the armpit    No past medical history on file.       ROS:   Review of Systems   See HPI    OBJECTIVE  Vitals:    10/30/24 0825   BP: 122/80   Pulse: 88   SpO2: 100%        Physical Exam  Constitutional:       General: He is not in acute distress.     Appearance: Normal appearance. He is obese. He is not ill-appearing, toxic-appearing or diaphoretic.   HENT:      Head: Normocephalic and atraumatic.   Skin:     Findings: Erythema and rash present.   Neurological:      Mental Status: He is alert.                          ASSESSMENT AND PLAN   Jason was seen today for rash.    Diagnoses and all orders for this visit:    Irritant contact dermatitis due to metals  Patient has contact dermatitis likely due to heavy metals from deodorant.  He is attempted multiple different types without significant improvement.  I did give him some recommendations on Dove and there is 0% which has no heavy metals in it.  He is to use a small amount of Kenalog cream 0.1%.  This is to help reduce the swelling and irritation of the rash underneath the axilla.  He is then to attend other options for his deodorant.  -     triamcinolone (KENALOG) 0.1 % cream; Apply topically 2 (two) times a day    Mild  eczema  Small area of eczema on the lateral aspect of the right eye.  He is to use a very small amount of the Kenalog cream that was sent to the pharmacy for irritant dermatitis.  If this does not improve after several weeks, he is to call and let me know so that we can follow-up.  -     triamcinolone (KENALOG) 0.1 % cream; Apply topically 2 (two) times a day    Seborrheic dermatitis  Patient has multiple areas associated with seborrheic dermatitis.  I do believe that the back of his head, area around his eyebrows and over the nasal bridge is likely seborrheic dermatitis.  It is possible that it could be more of a folliculitis, but he has attempted clindamycin and minocycline without improved symptoms.  He is to utilize head and shoulders in between the times that he cannot use ketoconazole.  He is only to use the prescribed shampoo twice a week.  -     ketoconazole (NIZORAL) 2 % shampoo; Apply 1 Application topically 2 (two) times a week      DO Bebe Walsh [facility]  10/30/2024 9:03 AM

## 2024-10-30 NOTE — PATIENT INSTRUCTIONS
Please start using the triamcinolone cream in the armpits.  Remember only a small amount since there will be overlap of skin. This will increase potency    Please start using ketoconazole shampoo twice a week.  You can use it on the eyebrows, scalp, around over nose and between eye brows.  Avoid eyes.      On the days that you are not using the ketoconazole shampoo then try head and shoulder or Selsun blue.      Lastly use the triamcinolone on the area near eye.  Please avoid getting any cream into the eye

## 2024-11-26 DIAGNOSIS — L21.9 SEBORRHEIC DERMATITIS: ICD-10-CM

## 2024-11-27 RX ORDER — KETOCONAZOLE 20 MG/ML
1 SHAMPOO, SUSPENSION TOPICAL 2 TIMES WEEKLY
Qty: 120 ML | Refills: 0 | Status: SHIPPED | OUTPATIENT
Start: 2024-11-28

## 2024-11-27 NOTE — TELEPHONE ENCOUNTER
Jason is returning our phone call.   He states that the rash under his arm is doing well with the cream that he was given however, the rash on the back of his head is not getting better - he does not think the shampoo is helping.     Please be advised, thank you.

## 2025-02-07 ENCOUNTER — TELEPHONE (OUTPATIENT)
Dept: FAMILY MEDICINE CLINIC | Facility: CLINIC | Age: 50
End: 2025-02-07

## 2025-02-07 NOTE — TELEPHONE ENCOUNTER
Jason Sandy    Patients wife was just put on Adoxa 100 mg and wife's Dr told him to call his PCP to also go on the same medication.    Pharmacy:  [facility]/pharmacy #[id number] - [place] - [address]  [address] [address]  Phone: [phone]  Fax: [phone]     CB: [phone]

## 2025-02-07 NOTE — TELEPHONE ENCOUNTER
I spoke with patients  and he advised his wife if being treated by GYN. Patient has not been seen in the office and would need an appointment.We  had non available. Patient [facility]

## 2025-05-02 ENCOUNTER — RESULTS FOLLOW-UP (OUTPATIENT)
Dept: FAMILY MEDICINE CLINIC | Facility: CLINIC | Age: 50
End: 2025-05-02

## 2025-05-02 ENCOUNTER — APPOINTMENT (OUTPATIENT)
Dept: LAB | Facility: CLINIC | Age: 50
End: 2025-05-02
Payer: COMMERCIAL

## 2025-05-02 ENCOUNTER — OFFICE VISIT (OUTPATIENT)
Dept: URGENT CARE | Facility: CLINIC | Age: 50
End: 2025-05-02
Payer: COMMERCIAL

## 2025-05-02 ENCOUNTER — OFFICE VISIT (OUTPATIENT)
Dept: FAMILY MEDICINE CLINIC | Facility: CLINIC | Age: 50
End: 2025-05-02

## 2025-05-02 ENCOUNTER — TELEPHONE (OUTPATIENT)
Age: 50
End: 2025-05-02

## 2025-05-02 ENCOUNTER — HOSPITAL ENCOUNTER (OUTPATIENT)
Dept: CT IMAGING | Facility: HOSPITAL | Age: 50
Discharge: HOME | End: 2025-05-02
Payer: COMMERCIAL

## 2025-05-02 VITALS
WEIGHT: 187 LBS | HEIGHT: 65 IN | BODY MASS INDEX: 31.16 KG/M2 | OXYGEN SATURATION: 99 % | TEMPERATURE: 98.7 F | SYSTOLIC BLOOD PRESSURE: 132 MMHG | HEART RATE: 92 BPM | RESPIRATION RATE: 16 BRPM | DIASTOLIC BLOOD PRESSURE: 84 MMHG

## 2025-05-02 VITALS
BODY MASS INDEX: 31.16 KG/M2 | DIASTOLIC BLOOD PRESSURE: 83 MMHG | HEIGHT: 65 IN | SYSTOLIC BLOOD PRESSURE: 137 MMHG | WEIGHT: 187 LBS | RESPIRATION RATE: 16 BRPM | HEART RATE: 90 BPM | OXYGEN SATURATION: 99 % | TEMPERATURE: 98.4 F

## 2025-05-02 DIAGNOSIS — Z13.0 SCREENING FOR DEFICIENCY ANEMIA: ICD-10-CM

## 2025-05-02 DIAGNOSIS — R53.83 OTHER FATIGUE: ICD-10-CM

## 2025-05-02 DIAGNOSIS — D72.89 ATYPICAL LYMPHOCYTES PRESENT ON PERIPHERAL BLOOD SMEAR: ICD-10-CM

## 2025-05-02 DIAGNOSIS — Z13.1 SCREENING FOR DIABETES MELLITUS: ICD-10-CM

## 2025-05-02 DIAGNOSIS — R10.31 RIGHT LOWER QUADRANT ABDOMINAL PAIN: ICD-10-CM

## 2025-05-02 DIAGNOSIS — Z11.4 SCREENING FOR HIV (HUMAN IMMUNODEFICIENCY VIRUS): ICD-10-CM

## 2025-05-02 DIAGNOSIS — R61 UNEXPLAINED NIGHT SWEATS: ICD-10-CM

## 2025-05-02 DIAGNOSIS — K59.03 DRUG-INDUCED CONSTIPATION: ICD-10-CM

## 2025-05-02 DIAGNOSIS — R53.83 FATIGUE, UNSPECIFIED TYPE: Primary | ICD-10-CM

## 2025-05-02 DIAGNOSIS — Z13.29 SCREENING FOR THYROID DISORDER: ICD-10-CM

## 2025-05-02 DIAGNOSIS — Z12.11 SCREENING FOR COLON CANCER: ICD-10-CM

## 2025-05-02 DIAGNOSIS — R91.1 PULMONARY NODULE, LEFT: Primary | ICD-10-CM

## 2025-05-02 DIAGNOSIS — E53.8 B12 DEFICIENCY: ICD-10-CM

## 2025-05-02 DIAGNOSIS — M54.50 ACUTE RIGHT-SIDED LOW BACK PAIN WITHOUT SCIATICA: ICD-10-CM

## 2025-05-02 DIAGNOSIS — K62.89 ACUTE PROCTITIS: ICD-10-CM

## 2025-05-02 DIAGNOSIS — Z00.00 ANNUAL PHYSICAL EXAM: Primary | ICD-10-CM

## 2025-05-02 DIAGNOSIS — Z11.59 NEED FOR HEPATITIS C SCREENING TEST: ICD-10-CM

## 2025-05-02 DIAGNOSIS — R63.4 WEIGHT LOSS: ICD-10-CM

## 2025-05-02 DIAGNOSIS — R79.0 LOW IRON STORES: ICD-10-CM

## 2025-05-02 LAB
ALBUMIN SERPL BCG-MCNC: 4.5 G/DL (ref 3.5–5)
ALP SERPL-CCNC: 91 U/L (ref 34–104)
ALT SERPL W P-5'-P-CCNC: 29 U/L (ref 7–52)
ANION GAP SERPL CALCULATED.3IONS-SCNC: 7 MMOL/L (ref 4–13)
AST SERPL W P-5'-P-CCNC: 50 U/L (ref 13–39)
BILIRUB SERPL-MCNC: 0.66 MG/DL (ref 0.2–1)
BUN SERPL-MCNC: 12 MG/DL (ref 5–25)
CALCIUM SERPL-MCNC: 8.9 MG/DL (ref 8.4–10.2)
CHLORIDE SERPL-SCNC: 105 MMOL/L (ref 96–108)
CO2 SERPL-SCNC: 28 MMOL/L (ref 21–32)
CREAT SERPL-MCNC: 1.02 MG/DL (ref 0.6–1.3)
ERYTHROCYTE [DISTWIDTH] IN BLOOD BY AUTOMATED COUNT: 12.8 % (ref 11.6–15.1)
EST. AVERAGE GLUCOSE BLD GHB EST-MCNC: 111 MG/DL
FERRITIN SERPL-MCNC: 435 NG/ML (ref 30–336)
GFR SERPL CREATININE-BSD FRML MDRD: 85 ML/MIN/1.73SQ M
GLUCOSE SERPL-MCNC: 95 MG/DL (ref 65–140)
HBA1C MFR BLD: 5.5 %
HCT VFR BLD AUTO: 40.7 % (ref 36.5–49.3)
HGB BLD-MCNC: 13.9 G/DL (ref 12–17)
IRON SATN MFR SERPL: 9 % (ref 15–50)
IRON SERPL-MCNC: 28 UG/DL (ref 50–212)
MAGNESIUM SERPL-MCNC: 2 MG/DL (ref 1.9–2.7)
MCH RBC QN AUTO: 28.2 PG (ref 26.8–34.3)
MCHC RBC AUTO-ENTMCNC: 34.2 G/DL (ref 31.4–37.4)
MCV RBC AUTO: 83 FL (ref 82–98)
PLATELET # BLD AUTO: 143 THOUSANDS/UL (ref 149–390)
PMV BLD AUTO: 10 FL (ref 8.9–12.7)
POTASSIUM SERPL-SCNC: 3.7 MMOL/L (ref 3.5–5.3)
PROT SERPL-MCNC: 7 G/DL (ref 6.4–8.4)
RBC # BLD AUTO: 4.93 MILLION/UL (ref 3.88–5.62)
SARS-COV-2 AG UPPER RESP QL IA: NEGATIVE
SL AMB  POCT GLUCOSE, UA: NORMAL
SL AMB LEUKOCYTE ESTERASE,UA: NORMAL
SL AMB POCT BILIRUBIN,UA: NORMAL
SL AMB POCT BLOOD,UA: NORMAL
SL AMB POCT CLARITY,UA: NORMAL
SL AMB POCT COLOR,UA: NORMAL
SL AMB POCT KETONES,UA: NORMAL
SL AMB POCT NITRITE,UA: NORMAL
SL AMB POCT PH,UA: 5
SL AMB POCT SPECIFIC GRAVITY,UA: 1.02
SL AMB POCT URINE PROTEIN: NORMAL
SL AMB POCT UROBILINOGEN: NORMAL
SODIUM SERPL-SCNC: 140 MMOL/L (ref 135–147)
TIBC SERPL-MCNC: 326.2 UG/DL (ref 250–450)
TRANSFERRIN SERPL-MCNC: 233 MG/DL (ref 203–362)
TSH SERPL DL<=0.05 MIU/L-ACNC: 3.26 UIU/ML (ref 0.45–4.5)
UIBC SERPL-MCNC: 298 UG/DL (ref 155–355)
VALID CONTROL: NORMAL
VIT B12 SERPL-MCNC: 200 PG/ML (ref 180–914)
WBC # BLD AUTO: 4.44 THOUSAND/UL (ref 4.31–10.16)

## 2025-05-02 PROCEDURE — 82728 ASSAY OF FERRITIN: CPT

## 2025-05-02 PROCEDURE — 87636 SARSCOV2 & INF A&B AMP PRB: CPT | Performed by: PHYSICIAN ASSISTANT

## 2025-05-02 PROCEDURE — 74176 CT ABD & PELVIS W/O CONTRAST: CPT

## 2025-05-02 PROCEDURE — 87811 SARS-COV-2 COVID19 W/OPTIC: CPT | Performed by: PHYSICIAN ASSISTANT

## 2025-05-02 PROCEDURE — 36415 COLL VENOUS BLD VENIPUNCTURE: CPT

## 2025-05-02 PROCEDURE — 86803 HEPATITIS C AB TEST: CPT

## 2025-05-02 PROCEDURE — 85025 COMPLETE CBC W/AUTO DIFF WBC: CPT

## 2025-05-02 PROCEDURE — 84443 ASSAY THYROID STIM HORMONE: CPT

## 2025-05-02 PROCEDURE — 85027 COMPLETE CBC AUTOMATED: CPT

## 2025-05-02 PROCEDURE — 84207 ASSAY OF VITAMIN B-6: CPT

## 2025-05-02 PROCEDURE — 83550 IRON BINDING TEST: CPT

## 2025-05-02 PROCEDURE — 87389 HIV-1 AG W/HIV-1&-2 AB AG IA: CPT

## 2025-05-02 PROCEDURE — 83036 HEMOGLOBIN GLYCOSYLATED A1C: CPT

## 2025-05-02 PROCEDURE — 80053 COMPREHEN METABOLIC PANEL: CPT

## 2025-05-02 PROCEDURE — 99213 OFFICE O/P EST LOW 20 MIN: CPT | Performed by: PHYSICIAN ASSISTANT

## 2025-05-02 PROCEDURE — 83735 ASSAY OF MAGNESIUM: CPT

## 2025-05-02 PROCEDURE — 85007 BL SMEAR W/DIFF WBC COUNT: CPT

## 2025-05-02 PROCEDURE — 83540 ASSAY OF IRON: CPT

## 2025-05-02 PROCEDURE — 82607 VITAMIN B-12: CPT

## 2025-05-02 RX ORDER — DOXYCYCLINE 100 MG/1
100 CAPSULE ORAL EVERY 12 HOURS SCHEDULED
Qty: 14 CAPSULE | Refills: 0 | Status: SHIPPED | OUTPATIENT
Start: 2025-05-02 | End: 2025-05-09

## 2025-05-02 NOTE — PATIENT INSTRUCTIONS
"Patient Education     Routine physical for adults   The Basics   Written by the doctors and editors at [facility]   What is a physical? -- A physical is a routine visit, or \"check-up,\" with your doctor. You might also hear it called a \"wellness visit\" or \"preventive visit.\"  During each visit, the doctor will:   Ask about your physical and mental health   Ask about your habits, behaviors, and lifestyle   Do an exam   Give you vaccines if needed   Talk to you about any medicines you take   Give advice about your health   Answer your questions  Getting regular check-ups is an important part of taking care of your health. It can help your doctor find and treat any problems you have. But it's also important for preventing health problems.  A routine physical is different from a \"sick visit.\" A sick visit is when you see a doctor because of a health concern or problem. Since physicals are scheduled ahead of time, you can think about what you want to ask the doctor.  How often should I get a physical? -- It depends on your age and health. In general, for people age 21 years and older:   If you are younger than 50 years, you might be able to get a physical every 3 years.   If you are 50 years or older, your doctor might recommend a physical every year.  If you have an ongoing health condition, like diabetes or high blood pressure, your doctor will probably want to see you more often.  What happens during a physical? -- In general, each visit will include:   Physical exam - The doctor or nurse will check your height, weight, heart rate, and blood pressure. They will also look at your eyes and ears. They will ask about how you are feeling and whether you have any symptoms that bother you.   Medicines - It's a good idea to bring a list of all the medicines you take to each doctor visit. Your doctor will talk to you about your medicines and answer any questions. Tell them if you are having any side effects that bother you. You " "should also tell them if you are having trouble paying for any of your medicines.   Habits and behaviors - This includes:   Your diet   Your exercise habits   Whether you smoke, drink alcohol, or use drugs   Whether you are sexually active   Whether you feel safe at home  Your doctor will talk to you about things you can do to improve your health and lower your risk of health problems. They will also offer help and support. For example, if you want to quit smoking, they can give you advice and might prescribe medicines. If you want to improve your diet or get more physical activity, they can help you with this, too.   Lab tests, if needed - The tests you get will depend on your age and situation. For example, your doctor might want to check your:   Cholesterol   Blood sugar   Iron level   Vaccines - The recommended vaccines will depend on your age, health, and what vaccines you already had. Vaccines are very important because they can prevent certain serious or deadly infections.   Discussion of screening - \"Screening\" means checking for diseases or other health problems before they cause symptoms. Your doctor can recommend screening based on your age, risk, and preferences. This might include tests to check for:   Cancer, such as breast, prostate, cervical, ovarian, colorectal, prostate, lung, or skin cancer   Sexually transmitted infections, such as chlamydia and gonorrhea   Mental health conditions like depression and anxiety  Your doctor will talk to you about the different types of screening tests. They can help you decide which screenings to have. They can also explain what the results might mean.   Answering questions - The physical is a good time to ask the doctor or nurse questions about your health. If needed, they can refer you to other doctors or specialists, too.  Adults older than 65 years often need other care, too. As you get older, your doctor will talk to you about:   How to prevent falling at " home   Hearing or vision tests   Memory testing   How to take your medicines safely   Making sure that you have the help and support you need at home  All topics are updated as new evidence becomes available and our peer review process is complete.  This topic retrieved from [employer] on: May 02, 2024.  Topic 206358 Version 1.0  Release: 32.4.3 - C32.122  © 2024 UpToDate, Inc. and/or its affiliates. All rights reserved.  Consumer Information Use and Disclaimer   Disclaimer: This generalized information is a limited summary of diagnosis, treatment, and/or medication information. It is not meant to be comprehensive and should be used as a tool to help the user understand and/or assess potential diagnostic and treatment options. It does NOT include all information about conditions, treatments, medications, side effects, or risks that may apply to a specific patient. It is not intended to be medical advice or a substitute for the medical advice, diagnosis, or treatment of a health care provider based on the health care provider's examination and assessment of a patient's specific and unique circumstances. Patients must speak with a health care provider for complete information about their health, medical questions, and treatment options, including any risks or benefits regarding use of medications. This information does not endorse any treatments or medications as safe, effective, or approved for treating a specific patient. UpToDate, Inc. and its affiliates disclaim any warranty or liability relating to this information or the use thereof.The use of this information is governed by the Terms of Use, available at https://www.[employer].com/en/know/clinical-effectiveness-terms. 2024© UpToDate, Inc. and its affiliates and/or licensors. All rights reserved.  Copyright   © 2024 UpToDate, Inc. and/or its affiliates. All rights reserved.

## 2025-05-02 NOTE — PROGRESS NOTES
"[facility]        NAME: Jason Hughes is a 50 y.o. male  : 1975    MRN: [id number]  DATE: May 2, 2025  TIME: 9:30 AM    /83   Pulse 90   Temp 98.4 °F (36.9 °C)   Resp 16   Ht 5' 5\" (1.651 m)   Wt 84.8 kg (187 lb)   SpO2 99%   BMI 31.12 kg/m²     Assessment and Plan   Fatigue, unspecified type [R53.83]  1. Fatigue, unspecified type  Poct Covid 19 Rapid Antigen Test      2. Weight loss              Patient Instructions       Follow up with PCP in 3-5 days.  Proceed to  ER if symptoms worsen.    Chief Complaint     Chief Complaint   Patient presents with    Cold Like Symptoms    Cough     URI s/s x 1.5 weeks with night sweats.          History of Present Illness       Pt with 1 1/2 weeks fatigue intermittent headaches some upset stomach  night sweats  and some wt loss         Review of Systems   Review of Systems   Constitutional:  Positive for fatigue and unexpected weight change.   HENT: Negative.     Eyes: Negative.    Respiratory: Negative.     Cardiovascular: Negative.    Gastrointestinal: Negative.    Endocrine: Negative.    Genitourinary: Negative.    Musculoskeletal: Negative.    Skin: Negative.    Allergic/Immunologic: Negative.    Neurological: Negative.    Hematological: Negative.    Psychiatric/Behavioral: Negative.     All other systems reviewed and are negative.        Current Medications       Current Outpatient Medications:     ketoconazole (NIZORAL) 2 % shampoo, APPLY 1 APPLICATION TOPICALLY 2 (TWO) TIMES A WEEK, Disp: 120 mL, Rfl: 0    triamcinolone (KENALOG) 0.1 % cream, Apply topically 2 (two) times a day, Disp: 80 g, Rfl: 0    Current Allergies     Allergies as of 2025    (No Known Allergies)            The following portions of the patient's history were reviewed and updated as appropriate: allergies, current medications, past family history, past medical history, past social history, past surgical history and problem list.     History reviewed. No pertinent past " "medical history.    History reviewed. No pertinent surgical history.    Family History   Problem Relation Age of Onset    Hyperlipidemia Father     Coronary artery disease Father          Medications have been verified.        Objective   /83   Pulse 90   Temp 98.4 °F (36.9 °C)   Resp 16   Ht 5' 5\" (1.651 m)   Wt 84.8 kg (187 lb)   SpO2 99%   BMI 31.12 kg/m²        Physical Exam     Physical Exam  Vitals and nursing note reviewed.   Constitutional:       Appearance: Normal appearance. He is normal weight.      Comments: Discussed with pt about need for follow up with family doctor possible blood eval  will do flu send out   Pt appears well     HENT:      Head: Normocephalic and atraumatic.      Right Ear: Tympanic membrane, ear canal and external ear normal.      Left Ear: Tympanic membrane, ear canal and external ear normal.      Nose: Nose normal.      Mouth/Throat:      Mouth: Mucous membranes are moist.      Pharynx: Oropharynx is clear.   Eyes:      Extraocular Movements: Extraocular movements intact.      Conjunctiva/sclera: Conjunctivae normal.      Pupils: Pupils are equal, round, and reactive to light.   Cardiovascular:      Rate and Rhythm: Normal rate and regular rhythm.      Pulses: Normal pulses.      Heart sounds: Normal heart sounds.   Pulmonary:      Effort: Pulmonary effort is normal.      Breath sounds: Normal breath sounds.   Abdominal:      General: Bowel sounds are normal.      Palpations: Abdomen is soft.   Musculoskeletal:         General: Normal range of motion.      Cervical back: Normal range of motion and neck supple.   Skin:     General: Skin is warm.      Capillary Refill: Capillary refill takes less than 2 seconds.   Neurological:      Mental Status: He is alert and oriented to person, place, and time.   Psychiatric:         Mood and Affect: Mood normal.                     "

## 2025-05-02 NOTE — TELEPHONE ENCOUNTER
[facility]. Advised that office was closed and PCP had not reviewed yet. Please call him to advise once reviewed.

## 2025-05-02 NOTE — PROGRESS NOTES
Adult Annual Physical  Name: Jason Hughes      : 1975      MRN: [id number]  Encounter Provider: ROCCO Colbert  Encounter Date: 2025   Encounter department: [facility]    :  Assessment & Plan  Annual physical exam  Routine screenings and labs ordered. Not due for immunizations. Will follow-up with results once received.        Other fatigue  In setting of ongoing fatigue, will check iron panel.   Orders:    Iron Panel (Includes Ferritin, Iron Sat%, Iron, and TIBC); Future    Vitamin B12; Future    Vitamin B6; Future    Magnesium; Future    Screening for thyroid disorder  In setting of ongoing fatigue, will screen for underlying thyroid disorder.  Orders:    TSH, 3rd generation with Free T4 reflex; Future    Screening for diabetes mellitus  In setting of age >35 and BMI >30, will screen for diabetes.  Orders:    Comprehensive metabolic panel; Future    Hemoglobin A1C; Future    Screening for deficiency anemia  In setting of ongoing fatigue, will f/o anemia.  Orders:    CBC and differential; Future    Acute right-sided low back pain without sciatica  POC urine reveals trace protein only.   Orders:    POCT urine dip    Screening for colon cancer  Last cologuard done in , due for 10/25. Order placed. Declining EGD/colonoscopy at this time.   Orders:    Cologuard    Right lower quadrant abdominal pain  Ongoing right sided lower back pain/RLQ abdominal pain on exam, will send for STAT US of appendix and f/u once results received.  Orders:    US appendix; Future    Need for hepatitis C screening test  Never done, one-time hepatitis C screening ordered.  Orders:    Hepatitis C Antibody; Future    Screening for HIV (human immunodeficiency virus)  Never done, one-time screening for HIV placed.  Orders:    HIV 1/2 AG/AB w Reflex [facility] for 2 yr old and above; Future    Annual physical exam               Preventive Screenings:  - Diabetes Screening: orders placed  - Cholesterol  Screening: orders placed   - Hepatitis C screening: orders placed   - HIV screening: orders placed   - Colon cancer screening: screening up-to-date   - Lung cancer screening: screening not indicated     Counseling/Anticipatory Guidance:  - Alcohol: discussed moderation in alcohol intake and recommendations for healthy alcohol use.   - Drug use: discussed harms of illicit drug use and how it can negatively impact mental/physical health.   - Tobacco use: discussed harms of tobacco use and management options for quitting.   - Dental health: discussed importance of regular tooth brushing, flossing, and dental visits.   - Sexual health: discussed sexually transmitted diseases, partner selection, use of condoms, avoidance of unintended pregnancy, and contraceptive alternatives.   - Diet: discussed recommendations for a healthy/well-balanced diet.   - Exercise: the importance of regular exercise/physical activity was discussed. Recommend exercise 3-5 times per week for at least 30 minutes.   - Injury prevention: discussed safety/seat belts, safety helmets, smoke detectors, carbon monoxide detectors, and smoking near bedding or upholstery.       Depression Screening and Follow-up Plan: Patient was screened for depression during today's encounter. They screened negative with a PHQ-2 score of 0.          History of Present Illness     Adult Annual Physical:  Patient presents for annual physical. 50 year old male presents to establish care and for evaluation of fatigue, night sweats, and right lower back pain x 1.5 weeks. He denies any known injury or recent illness. He denies diarrhea, decreased urination, increased urination/hunger/thirst, or fevers. He reports ongoing constipation and decreased appetite. He has not tried any interventions for symptoms. He was seen in Urgent care today - they did a COVID test that was negative and recommend evaluation to establish care/get blood work done..     Diet and Physical Activity:  -  "Diet/Nutrition: no special diet.  - Exercise: 5-7 times a week on average.    Depression Screening:  - PHQ-2 Score: 0    General Health:  - Sleep: 4-6 hours of sleep on average and sleeps poorly. Reports issues with sleep due to left shoulder pain  - Hearing: normal hearing right ear.  - Vision: no vision problems.  - Dental: regular dental visits.     Health:  - History of STDs: no.   - Urinary symptoms: none.     Advanced Care Planning:  - Has an advanced directive?: no    - Has a durable medical POA?: no    - ACP document given to patient?: no      Review of Systems   Constitutional:  Positive for appetite change, chills and fatigue. Negative for activity change and fever.   HENT:  Negative for congestion, ear pain and sore throat.    Eyes:  Negative for pain, discharge and visual disturbance.   Respiratory:  Negative for cough and shortness of breath.    Cardiovascular:  Negative for chest pain and palpitations.   Gastrointestinal:  Positive for abdominal pain. Negative for constipation, diarrhea, nausea and vomiting.   Genitourinary:  Negative for dysuria and hematuria.   Musculoskeletal:  Positive for back pain. Negative for arthralgias, myalgias and neck pain.   Skin:  Negative for color change and rash.   Allergic/Immunologic: Negative for environmental allergies and food allergies.   Neurological:  Negative for dizziness, seizures, syncope, light-headedness and headaches.   All other systems reviewed and are negative.        Objective   /84   Pulse 92   Temp 98.7 °F (37.1 °C) (Tympanic)   Resp 16   Ht 5' 5\" (1.651 m)   Wt 84.8 kg (187 lb)   SpO2 99%   BMI 31.12 kg/m²     Physical Exam  Vitals and nursing note reviewed.   Constitutional:       General: He is not in acute distress.     Appearance: He is well-developed.   HENT:      Head: Normocephalic and atraumatic.      Right Ear: Tympanic membrane, ear canal and external ear normal.      Left Ear: Tympanic membrane, ear canal and external " ear normal.      Nose: No congestion or rhinorrhea.      Mouth/Throat:      Mouth: Mucous membranes are moist.   Eyes:      Conjunctiva/sclera: Conjunctivae normal.   Cardiovascular:      Rate and Rhythm: Normal rate and regular rhythm.      Heart sounds: No murmur heard.  Pulmonary:      Effort: Pulmonary effort is normal. No respiratory distress.      Breath sounds: Normal breath sounds.   Abdominal:      Palpations: Abdomen is soft.      Tenderness: There is abdominal tenderness in the right lower quadrant. There is no right CVA tenderness, left CVA tenderness or guarding.   Musculoskeletal:         General: No swelling.      Cervical back: Neck supple.   Skin:     General: Skin is warm and dry.      Capillary Refill: Capillary refill takes less than 2 seconds.   Neurological:      General: No focal deficit present.      Mental Status: He is alert and oriented to person, place, and time.   Psychiatric:         Mood and Affect: Mood normal.         Behavior: Behavior normal.         Thought Content: Thought content normal.         Judgment: Judgment normal.

## 2025-05-02 NOTE — TELEPHONE ENCOUNTER
Patient made aware of CT results and blood work. Will add on urine sample to be collected at outpatient lab and patient to start abx after collecting specimen. Order for repeat CT in 6 months ordered as recommended. Will f/u with urine results if changes need to be made. All questions answered. Patient agreeable to plan.

## 2025-05-03 ENCOUNTER — RESULTS FOLLOW-UP (OUTPATIENT)
Dept: URGENT CARE | Facility: CLINIC | Age: 50
End: 2025-05-03

## 2025-05-03 ENCOUNTER — RESULTS FOLLOW-UP (OUTPATIENT)
Dept: FAMILY MEDICINE CLINIC | Facility: CLINIC | Age: 50
End: 2025-05-03

## 2025-05-03 ENCOUNTER — APPOINTMENT (OUTPATIENT)
Dept: LAB | Facility: CLINIC | Age: 50
End: 2025-05-03
Payer: COMMERCIAL

## 2025-05-03 DIAGNOSIS — K62.89 ACUTE PROCTITIS: ICD-10-CM

## 2025-05-03 LAB
FLUAV RNA RESP QL NAA+PROBE: NEGATIVE
FLUBV RNA RESP QL NAA+PROBE: NEGATIVE
HCV AB SER QL: NORMAL
HIV 1+2 AB+HIV1 P24 AG SERPL QL IA: NORMAL
SARS-COV-2 RNA RESP QL NAA+PROBE: NEGATIVE

## 2025-05-03 PROCEDURE — 87491 CHLMYD TRACH DNA AMP PROBE: CPT

## 2025-05-03 PROCEDURE — 87086 URINE CULTURE/COLONY COUNT: CPT

## 2025-05-03 PROCEDURE — 87591 N.GONORRHOEAE DNA AMP PROB: CPT

## 2025-05-03 RX ORDER — FERROUS SULFATE 324(65)MG
324 TABLET, DELAYED RELEASE (ENTERIC COATED) ORAL
Qty: 60 TABLET | Refills: 0 | Status: SHIPPED | OUTPATIENT
Start: 2025-05-03

## 2025-05-03 RX ORDER — DOCUSATE SODIUM 100 MG/1
100 CAPSULE, LIQUID FILLED ORAL 2 TIMES DAILY
Qty: 60 CAPSULE | Refills: 0 | Status: SHIPPED | OUTPATIENT
Start: 2025-05-03

## 2025-05-04 LAB — BACTERIA UR CULT: NORMAL

## 2025-05-05 ENCOUNTER — APPOINTMENT (OUTPATIENT)
Dept: LAB | Facility: CLINIC | Age: 50
End: 2025-05-05
Payer: COMMERCIAL

## 2025-05-05 DIAGNOSIS — R79.0 LOW IRON STORES: ICD-10-CM

## 2025-05-05 DIAGNOSIS — R53.83 OTHER FATIGUE: ICD-10-CM

## 2025-05-05 DIAGNOSIS — R61 UNEXPLAINED NIGHT SWEATS: ICD-10-CM

## 2025-05-05 LAB
ANISOCYTOSIS BLD QL SMEAR: PRESENT
BASOPHILS # BLD MANUAL: 0 THOUSAND/UL (ref 0–0.1)
BASOPHILS NFR MAR MANUAL: 0 % (ref 0–1)
C TRACH DNA SPEC QL NAA+PROBE: NEGATIVE
EOSINOPHIL # BLD MANUAL: 0.04 THOUSAND/UL (ref 0–0.4)
EOSINOPHIL NFR BLD MANUAL: 1 % (ref 0–6)
LG PLATELETS BLD QL SMEAR: PRESENT
LYMPHOCYTES # BLD AUTO: 2.18 THOUSAND/UL (ref 0.6–4.47)
LYMPHOCYTES # BLD AUTO: 31 % (ref 14–44)
MONOCYTES # BLD AUTO: 0.22 THOUSAND/UL (ref 0–1.22)
MONOCYTES NFR BLD: 5 % (ref 4–12)
N GONORRHOEA DNA SPEC QL NAA+PROBE: NEGATIVE
NEUTROPHILS # BLD MANUAL: 2 THOUSAND/UL (ref 1.85–7.62)
NEUTS SEG NFR BLD AUTO: 45 % (ref 43–75)
PATHOLOGY REVIEW: YES
PLATELET BLD QL SMEAR: ADEQUATE
RBC MORPH BLD: PRESENT
VARIANT LYMPHS # BLD AUTO: 18 %

## 2025-05-05 PROCEDURE — 36415 COLL VENOUS BLD VENIPUNCTURE: CPT

## 2025-05-05 PROCEDURE — 85060 BLOOD SMEAR INTERPRETATION: CPT | Performed by: STUDENT IN AN ORGANIZED HEALTH CARE EDUCATION/TRAINING PROGRAM

## 2025-05-05 PROCEDURE — 86480 TB TEST CELL IMMUN MEASURE: CPT

## 2025-05-05 NOTE — TELEPHONE ENCOUNTER
Patient notified of lab results - will add on quantiferon gold to r/o TB due to persistent night sweats, working in [place], and pulmonary nodule despite starting Doxycycline.

## 2025-05-06 ENCOUNTER — E-CONSULT (OUTPATIENT)
Dept: HEMATOLOGY ONCOLOGY | Facility: CLINIC | Age: 50
End: 2025-05-06
Payer: COMMERCIAL

## 2025-05-06 DIAGNOSIS — D69.6 THROMBOCYTOPENIA (HCC): ICD-10-CM

## 2025-05-06 DIAGNOSIS — E61.1 IRON DEFICIENCY: ICD-10-CM

## 2025-05-06 DIAGNOSIS — R61 NIGHT SWEATS: ICD-10-CM

## 2025-05-06 DIAGNOSIS — R91.1 SOLITARY PULMONARY NODULE: Primary | ICD-10-CM

## 2025-05-06 DIAGNOSIS — D72.89 ATYPICAL LYMPHOCYTES PRESENT ON PERIPHERAL BLOOD SMEAR: ICD-10-CM

## 2025-05-06 DIAGNOSIS — E53.8 B12 DEFICIENCY: ICD-10-CM

## 2025-05-06 LAB
GAMMA INTERFERON BACKGROUND BLD IA-ACNC: 0.64 IU/ML
M TB IFN-G BLD-IMP: NEGATIVE
M TB IFN-G CD4+ BCKGRND COR BLD-ACNC: -0.02 IU/ML
M TB IFN-G CD4+ BCKGRND COR BLD-ACNC: 0.06 IU/ML
MITOGEN IGNF BCKGRD COR BLD-ACNC: 7.5 IU/ML
VIT B6 SERPL-MCNC: 10.2 UG/L (ref 3.4–65.2)

## 2025-05-06 PROCEDURE — 99448 NTRPROF PH1/NTRNET/EHR 21-30: CPT | Performed by: PHYSICIAN ASSISTANT

## 2025-05-06 RX ORDER — LANOLIN ALCOHOL/MO/W.PET/CERES
1000 CREAM (GRAM) TOPICAL DAILY
Qty: 30 TABLET | Refills: 0 | Status: SHIPPED | OUTPATIENT
Start: 2025-05-06

## 2025-05-06 RX ORDER — QUINIDINE GLUCONATE 324 MG
240 TABLET, EXTENDED RELEASE ORAL EVERY OTHER DAY
Qty: 15 TABLET | Refills: 0 | Status: SHIPPED | OUTPATIENT
Start: 2025-05-06

## 2025-05-06 NOTE — PROGRESS NOTES
E-Consult  Jason Hughes 50 y.o. male MRN: [id number]  Encounter Date: 05/06/25      Reason for Consult / Principal Problem: Atypical lymphocytes, mild thrombocytopenia, fatigue/night sweats, lung nodule     Consulting Provider: Myla Rosen PA-C    Requesting Provider: Bren Jacob CRNP       ASSESSMENT:        RECOMMENDATIONS:  Abnormal CBC  Elevated % of atypical lymphocytes with a normal differential of the absolute values is likely reactive  Platelet count of 143 is not clinically significant.  Repeat CBC-D    B12 deficiency   B12 ideally should be above 400 -- patient's is 200  Start on oral B12 1000 mcg daily   This can contribute to his fatigue.    Iron deficiency   Iron sat 9%, iron 28, ferritin 435   Above values are consistent with iron def with low iron sat and low serum iron. Elevated ferritin is an acute phase reactant likely elevated just as atypical lymphocytes were elevated   Start on oral iron. Formulations best tolerated: Vitron C, ferrous gluconate. Take every other day   Assess for cause of iron def  Iron deficiency is not a primary hematological process. Causes of iron deficiency include 3 major categories:     A. Blood loss (GI bleed, frequent blood donation, frequent epistaxis)  B. Malabsorption (Pardeep-en Y or sleeve gastrectomy, active or recent H. pylori, chronic prolonged PPI use)  C.  Insufficient dietary intake      *This is likely contributing to fatigue as well.     Lung nodule (6 mm LLL)  Incidental finding on CT abd/pelvis  Agree with dedicated CT chest which is ordered   If more than 1 nodule, ref to pulmonary. If just one, repeat imaging in 6 months as rec by radiology     5. Night Sweats   Work up for night sweats, if drenching (ie changing shirt/pillow case/sheets), R/O malignany with CBC-D and CT C/A/P which is already being completed.     Total time spent  21-30 minutes, >50% of the total time devoted to medical consultative verbal/EMR discussion between  providers. Written report will be generated in the EMR. .

## 2025-05-09 ENCOUNTER — E-CONSULT (OUTPATIENT)
Dept: OTHER | Facility: HOSPITAL | Age: 50
End: 2025-05-09
Payer: COMMERCIAL

## 2025-05-09 ENCOUNTER — APPOINTMENT (OUTPATIENT)
Dept: LAB | Facility: CLINIC | Age: 50
End: 2025-05-09
Payer: COMMERCIAL

## 2025-05-09 DIAGNOSIS — R53.83 OTHER FATIGUE: ICD-10-CM

## 2025-05-09 DIAGNOSIS — D72.89 ATYPICAL LYMPHOCYTES PRESENT ON PERIPHERAL BLOOD SMEAR: Primary | ICD-10-CM

## 2025-05-09 DIAGNOSIS — D72.89 ATYPICAL LYMPHOCYTES PRESENT ON PERIPHERAL BLOOD SMEAR: ICD-10-CM

## 2025-05-09 DIAGNOSIS — R61 UNEXPLAINED NIGHT SWEATS: ICD-10-CM

## 2025-05-09 PROCEDURE — 86645 CMV ANTIBODY IGM: CPT

## 2025-05-09 PROCEDURE — 99448 NTRPROF PH1/NTRNET/EHR 21-30: CPT | Performed by: INTERNAL MEDICINE

## 2025-05-09 PROCEDURE — 86308 HETEROPHILE ANTIBODY SCREEN: CPT

## 2025-05-09 PROCEDURE — 86644 CMV ANTIBODY: CPT

## 2025-05-09 PROCEDURE — 36415 COLL VENOUS BLD VENIPUNCTURE: CPT

## 2025-05-09 PROCEDURE — 86663 EPSTEIN-BARR ANTIBODY: CPT

## 2025-05-09 PROCEDURE — 86665 EPSTEIN-BARR CAPSID VCA: CPT

## 2025-05-09 PROCEDURE — 86664 EPSTEIN-BARR NUCLEAR ANTIGEN: CPT

## 2025-05-09 NOTE — PROGRESS NOTES
"E-Consult  Jason Hughes 50 y.o. male MRN: [id number]  Encounter Date: 05/09/25      Reason for Consult / Principal Problem: Atypical lymphocytosis.    Consulting Provider: Sina Larry MD    Requesting Provider: Bren Jacob CRNP       ASSESSMENT:  Patient with 2-3 week of night sweats, now with lymphadenopathy, normal WBC with atypical lymphocytosis and elevated transaminase (AST).  Clinical picture is suggestive of infectious mononucleosis.  In patients older than young adult, infectious mononucleosis can many times present atypically, without significant tonsillitis.  Patient should get Monospot and EBV/CMV serologies.  If Monospot is positive or EBV/CMV serologies has elevated IgM, infectious mononucleosis would be very likely.  This is self-limiting infection.  Treatment is supportive care.    RECOMMENDATIONS:  Recommend checking Monospot, EBV and CMV serologies.  If these are positive, treatment is supportive care.    Attempted to communicate with patient's PCP, ROCCO Colbert via epic text but not completed due to \"do not disturb\" status.    Total time spent  21-30 minutes, >50% of the total time devoted to medical consultative verbal/EMR discussion between providers. Written report will be generated in the EMR. .  "

## 2025-05-10 LAB — HETEROPH AB SER QL: NEGATIVE

## 2025-05-12 ENCOUNTER — RESULTS FOLLOW-UP (OUTPATIENT)
Dept: FAMILY MEDICINE CLINIC | Facility: CLINIC | Age: 50
End: 2025-05-12

## 2025-05-12 LAB
CMV IGG SERPL QL IA: POSITIVE
CMV IGM SERPL QL IA: POSITIVE
EBV EA IGG SER QL IA: NEGATIVE
EBV NA IGG SER QL IA: NEGATIVE
EBV VCA IGG SER QL IA: POSITIVE
EBV VCA IGM SER QL IA: POSITIVE

## 2025-05-14 LAB — COLOGUARD RESULT REPORTABLE: NEGATIVE

## 2025-05-29 ENCOUNTER — OFFICE VISIT (OUTPATIENT)
Dept: FAMILY MEDICINE CLINIC | Facility: CLINIC | Age: 50
End: 2025-05-29
Payer: COMMERCIAL

## 2025-05-29 VITALS
SYSTOLIC BLOOD PRESSURE: 122 MMHG | OXYGEN SATURATION: 98 % | HEIGHT: 65 IN | HEART RATE: 86 BPM | WEIGHT: 185 LBS | BODY MASS INDEX: 30.82 KG/M2 | DIASTOLIC BLOOD PRESSURE: 80 MMHG

## 2025-05-29 DIAGNOSIS — R91.1 PULMONARY NODULE, LEFT: ICD-10-CM

## 2025-05-29 DIAGNOSIS — B27.00 EBV POSITIVE MONONUCLEOSIS SYNDROME: Primary | ICD-10-CM

## 2025-05-29 DIAGNOSIS — J30.89 ENVIRONMENTAL AND SEASONAL ALLERGIES: ICD-10-CM

## 2025-05-29 DIAGNOSIS — E53.8 B12 DEFICIENCY: ICD-10-CM

## 2025-05-29 PROCEDURE — 99214 OFFICE O/P EST MOD 30 MIN: CPT

## 2025-05-29 RX ORDER — FLUTICASONE PROPIONATE 50 MCG
1 SPRAY, SUSPENSION (ML) NASAL DAILY
Qty: 9.9 ML | Refills: 0 | Status: SHIPPED | OUTPATIENT
Start: 2025-05-29

## 2025-05-29 RX ORDER — OLOPATADINE HYDROCHLORIDE 1 MG/ML
1 SOLUTION OPHTHALMIC 2 TIMES DAILY
Qty: 5 ML | Refills: 0 | Status: SHIPPED | OUTPATIENT
Start: 2025-05-29

## 2025-05-29 NOTE — PROGRESS NOTES
Name: Jason Hughes      : 1975      MRN: [id number]  Encounter Provider: ROCCO Colbert  Encounter Date: 2025   Encounter department: [facility]  :  Assessment & Plan  EBV positive mononucleosis syndrome  Reports body aches have since resolved. Continue supportive care. Advised he will likely test positive for life as EBV stays dormant in the body therefore no repeat labs are recommended. Advised avoiding sharing drinks/close contact with others when with flu-like symptoms (body aches, chills, fevers). Patient verbalizes understanding and agreeable to plan.        Component  Ref Range & Units (hover) 25  2:44 PM   CMV IGG Positive Abnormal    Comment: The clinical diagnosis should be considered in association with other clinical data and patient symptoms.    Negative: Absence of detectable CMV IgG antibodies. A negative result generally indicates that immunity has not been acquired. If exposure to CMV is suspected despite a negative finding, a second sample should be collected and tested no less than one or two weeks later.    Equivocal: Equivocal result. Recommend recollection of new sample.    Positive: Presence of detectable CMV IgG antibodies. A positive result generally indicates either recent or past exposure to the CMV.     CMVIGM Positive Abnormal    Comment: The clinical diagnosis should be considered in association with other clinical data and patient symptoms.    Negative: Absence of detectable CMV IgM antibodies. A negative result, however, does not always rule out acute CMV infection. The IgM response may not be detectable in the very early stage of the infection or if the patient is immunocompromised. If clinical exposure to CMV is suspected despite a negative finding, a second sample should be collected and tested no less than one or two weeks later.    Equivocal: Equivocal result. Recommend recollection of new sample.    Positive: Presence of detectable  Sherrill Rubio DO [facility] Pool  Needs wellness visit end of march , early April    Copied from CC Chart.    TY Brown  [facility]       CMV IgM antibodies. A positive result is generally indicative of acute infection, reactivation or persistent IgM production.     Environmental and seasonal allergies  Started 2 days ago after returning from [place] - itchy eyes, congestion, sneezing. Recommend OTC products.   Orders:    olopatadine (PATANOL) 0.1 % ophthalmic solution; Administer 1 drop to both eyes 2 (two) times a day    fluticasone (FLONASE) 50 mcg/act nasal spray; 1 spray into each nostril daily    B12 deficiency  Recommend to start on B12 and iron supplements per heme/onc. Redraw of labs ordered for 06/17/25.        Component  Ref Range & Units (hover) 5/2/25  3:38 PM   Iron Saturation 9 Low    TIBC 326.2   Iron 28 Low    Comment: Patients treated with metal-binding drugs (ie. Deferoxamine) may have depressed iron values.   Transferrin 233   UIBC 298                   Component  Ref Range & Units (hover) 5/2/25  3:38 PM   Vitamin B-12 200        Pulmonary nodule, left  Incidental finding on imaging, repeat CT scan ordered for November. Denies SOB. TB test was negative. Denies smoking history. Will f/u after completed or sooner pending abnormalities on outstanding labs.      IMPRESSION:     1.  Proctitis.  2.  Cystitis.  3.  There is a 6 mm left lower lobe pulmonary nodule. Per Fleischner criteria, recommend follow-up chest CT in 6 months.  4.  As clinically queried, if low back pain persists, recommend MRI of the lumbar spine, which is more sensitive for evaluation.     The study was marked in EPIC for immediate notification.     Workstation performed: [id number]         History of Present Illness   50 year old male presents for follow-up regarding management of iron deficiency and EBV+ status noted on labs. He was c/o ongoing body aches and incidentally found to have a pulmonary nodule. He does work in [place] - his TB test was negative but referred to heme/onc and ID for further work-up. Labs were completed as recommended and he was found to have  "a low B12, low ferritin/iron, and EBV+. He was treated with oral iron and b12 supplements, which he is still taking. Recommended supportive treatment for EBV. He feels body aches and fatigue have improved. Has since developed allergy symptoms after returning home from [place] 2 days ago. Denies fevers, chills, body aches, abdominal pain, cough, SOB, or NVD. Reports itchy eyes, sneezing, and congestion. He has not tried any interventions for symptoms.       Review of Systems   Constitutional:  Negative for activity change, appetite change, chills, fatigue and fever.   HENT:  Positive for congestion and rhinorrhea. Negative for ear pain, sinus pressure, sinus pain, sneezing, sore throat and trouble swallowing.    Eyes:  Positive for itching. Negative for pain, discharge, redness and visual disturbance.   Respiratory:  Negative for cough and shortness of breath.    Cardiovascular:  Negative for chest pain and palpitations.   Gastrointestinal:  Negative for abdominal pain, constipation, diarrhea, nausea and vomiting.   Genitourinary:  Negative for dysuria and hematuria.   Musculoskeletal:  Negative for arthralgias and back pain.   Skin:  Negative for color change and rash.   Allergic/Immunologic: Positive for environmental allergies. Negative for food allergies.   Neurological:  Negative for dizziness, seizures, syncope, light-headedness and headaches.   All other systems reviewed and are negative.      Objective   /80   Pulse 86   Ht 5' 5\" (1.651 m)   Wt 83.9 kg (185 lb)   SpO2 98%   BMI 30.79 kg/m²      Physical Exam  Vitals and nursing note reviewed.   Constitutional:       General: He is awake. He is not in acute distress.     Appearance: Normal appearance. He is well-developed and overweight.   HENT:      Head: Normocephalic and atraumatic.      Right Ear: Tympanic membrane, ear canal and external ear normal.      Left Ear: Tympanic membrane, ear canal and external ear normal.      Nose: Congestion " and rhinorrhea present.      Mouth/Throat:      Mouth: Mucous membranes are moist.     Eyes:      Conjunctiva/sclera: Conjunctivae normal.       Cardiovascular:      Rate and Rhythm: Normal rate and regular rhythm.      Pulses: Normal pulses.      Heart sounds: Normal heart sounds. No murmur heard.  Pulmonary:      Effort: Pulmonary effort is normal. No respiratory distress.      Breath sounds: Normal breath sounds.   Abdominal:      General: Abdomen is flat.      Palpations: Abdomen is soft.      Tenderness: There is no abdominal tenderness.     Musculoskeletal:         General: No swelling.      Cervical back: Neck supple.     Skin:     General: Skin is warm and dry.      Capillary Refill: Capillary refill takes less than 2 seconds.     Neurological:      General: No focal deficit present.      Mental Status: He is alert and oriented to person, place, and time.     Psychiatric:         Mood and Affect: Mood normal.         Behavior: Behavior normal. Behavior is cooperative.         Thought Content: Thought content normal.         Judgment: Judgment normal.

## 2025-06-13 ENCOUNTER — RESULTS FOLLOW-UP (OUTPATIENT)
Dept: FAMILY MEDICINE CLINIC | Facility: CLINIC | Age: 50
End: 2025-06-13

## 2025-06-13 ENCOUNTER — APPOINTMENT (OUTPATIENT)
Dept: LAB | Facility: CLINIC | Age: 50
End: 2025-06-13
Payer: COMMERCIAL

## 2025-06-13 DIAGNOSIS — E53.8 B12 DEFICIENCY: ICD-10-CM

## 2025-06-13 DIAGNOSIS — D72.89 ATYPICAL LYMPHOCYTES PRESENT ON PERIPHERAL BLOOD SMEAR: ICD-10-CM

## 2025-06-13 LAB
BASOPHILS # BLD AUTO: 0.03 THOUSANDS/ÂΜL (ref 0–0.1)
BASOPHILS NFR BLD AUTO: 0 % (ref 0–1)
EOSINOPHIL # BLD AUTO: 0.11 THOUSAND/ÂΜL (ref 0–0.61)
EOSINOPHIL NFR BLD AUTO: 1 % (ref 0–6)
ERYTHROCYTE [DISTWIDTH] IN BLOOD BY AUTOMATED COUNT: 13.2 % (ref 11.6–15.1)
FERRITIN SERPL-MCNC: 166 NG/ML (ref 30–336)
HCT VFR BLD AUTO: 43.8 % (ref 36.5–49.3)
HGB BLD-MCNC: 14.7 G/DL (ref 12–17)
IMM GRANULOCYTES # BLD AUTO: 0.02 THOUSAND/UL (ref 0–0.2)
IMM GRANULOCYTES NFR BLD AUTO: 0 % (ref 0–2)
IRON SATN MFR SERPL: 34 % (ref 15–50)
IRON SERPL-MCNC: 123 UG/DL (ref 50–212)
LYMPHOCYTES # BLD AUTO: 3.56 THOUSANDS/ÂΜL (ref 0.6–4.47)
LYMPHOCYTES NFR BLD AUTO: 47 % (ref 14–44)
MCH RBC QN AUTO: 28.2 PG (ref 26.8–34.3)
MCHC RBC AUTO-ENTMCNC: 33.6 G/DL (ref 31.4–37.4)
MCV RBC AUTO: 84 FL (ref 82–98)
MONOCYTES # BLD AUTO: 0.67 THOUSAND/ÂΜL (ref 0.17–1.22)
MONOCYTES NFR BLD AUTO: 9 % (ref 4–12)
NEUTROPHILS # BLD AUTO: 3.26 THOUSANDS/ÂΜL (ref 1.85–7.62)
NEUTS SEG NFR BLD AUTO: 43 % (ref 43–75)
NRBC BLD AUTO-RTO: 0 /100 WBCS
PLATELET # BLD AUTO: 197 THOUSANDS/UL (ref 149–390)
PMV BLD AUTO: 9.9 FL (ref 8.9–12.7)
RBC # BLD AUTO: 5.22 MILLION/UL (ref 3.88–5.62)
TIBC SERPL-MCNC: 357 UG/DL (ref 250–450)
TRANSFERRIN SERPL-MCNC: 255 MG/DL (ref 203–362)
UIBC SERPL-MCNC: 234 UG/DL (ref 155–355)
VIT B12 SERPL-MCNC: 303 PG/ML (ref 180–914)
WBC # BLD AUTO: 7.65 THOUSAND/UL (ref 4.31–10.16)

## 2025-06-13 PROCEDURE — 83540 ASSAY OF IRON: CPT

## 2025-06-13 PROCEDURE — 85025 COMPLETE CBC W/AUTO DIFF WBC: CPT

## 2025-06-13 PROCEDURE — 82728 ASSAY OF FERRITIN: CPT

## 2025-06-13 PROCEDURE — 82607 VITAMIN B-12: CPT

## 2025-06-13 PROCEDURE — 83550 IRON BINDING TEST: CPT
